# Patient Record
Sex: FEMALE | Race: BLACK OR AFRICAN AMERICAN | NOT HISPANIC OR LATINO | Employment: UNEMPLOYED | URBAN - METROPOLITAN AREA
[De-identification: names, ages, dates, MRNs, and addresses within clinical notes are randomized per-mention and may not be internally consistent; named-entity substitution may affect disease eponyms.]

---

## 2023-06-26 RX ORDER — FUROSEMIDE 20 MG/1
1 TABLET ORAL DAILY PRN
COMMUNITY
Start: 2020-03-13

## 2023-06-26 RX ORDER — DOCUSATE SODIUM 50 MG/5ML
LIQUID ORAL
COMMUNITY
Start: 2020-11-20

## 2023-06-26 RX ORDER — FERROUS SULFATE 220 (44)/5
SOLUTION, ORAL ORAL
COMMUNITY
Start: 2020-10-05

## 2023-06-26 RX ORDER — WARFARIN 4 MG/1
TABLET ORAL
COMMUNITY
Start: 2020-08-31

## 2023-06-26 RX ORDER — FLUTICASONE PROPIONATE 50 MCG
1 SPRAY, SUSPENSION (ML) NASAL 2 TIMES DAILY
COMMUNITY
Start: 2020-12-26

## 2023-06-26 RX ORDER — MELATONIN 3 MG
CAPSULE ORAL
COMMUNITY
Start: 2020-01-30

## 2023-06-26 RX ORDER — ACETAMINOPHEN 500 MG
TABLET ORAL EVERY 6 HOURS PRN
COMMUNITY

## 2023-06-26 RX ORDER — LIDOCAINE 50 MG/G
PATCH TOPICAL
COMMUNITY
Start: 2020-01-30

## 2023-06-26 RX ORDER — WARFARIN 3 MG/1
TABLET ORAL
COMMUNITY
Start: 2020-03-13

## 2023-06-26 RX ORDER — ONDANSETRON 4 MG/1
TABLET, FILM COATED ORAL
COMMUNITY
Start: 2020-12-29

## 2023-06-26 RX ORDER — WARFARIN 2.5 MG/1
TABLET ORAL
COMMUNITY
Start: 2020-04-27

## 2023-06-26 RX ORDER — LACTULOSE 10 G/15ML
SOLUTION ORAL; RECTAL
COMMUNITY
Start: 2020-11-19

## 2023-06-26 RX ORDER — PHENOL 1.4 %
1 AEROSOL, SPRAY (ML) MUCOUS MEMBRANE DAILY
COMMUNITY
Start: 2020-01-30

## 2023-06-26 RX ORDER — ASCORBIC ACID 500 MG
1 TABLET ORAL DAILY
COMMUNITY
Start: 2020-09-15

## 2024-07-24 ENCOUNTER — APPOINTMENT (OUTPATIENT)
Dept: RADIOLOGY | Facility: HOSPITAL | Age: 89
End: 2024-07-24
Payer: MEDICARE

## 2024-07-24 ENCOUNTER — CLINICAL SUPPORT (OUTPATIENT)
Dept: EMERGENCY MEDICINE | Facility: HOSPITAL | Age: 89
End: 2024-07-24
Payer: MEDICARE

## 2024-07-24 ENCOUNTER — HOSPITAL ENCOUNTER (EMERGENCY)
Facility: HOSPITAL | Age: 89
Discharge: HOME | End: 2024-07-25
Attending: EMERGENCY MEDICINE
Payer: MEDICARE

## 2024-07-24 VITALS
HEART RATE: 60 BPM | SYSTOLIC BLOOD PRESSURE: 124 MMHG | TEMPERATURE: 98 F | OXYGEN SATURATION: 97 % | RESPIRATION RATE: 16 BRPM | HEIGHT: 63 IN | BODY MASS INDEX: 22.5 KG/M2 | DIASTOLIC BLOOD PRESSURE: 76 MMHG | WEIGHT: 127 LBS

## 2024-07-24 DIAGNOSIS — R11.2 NAUSEA AND VOMITING, UNSPECIFIED VOMITING TYPE: Primary | ICD-10-CM

## 2024-07-24 DIAGNOSIS — N30.00 ACUTE CYSTITIS WITHOUT HEMATURIA: ICD-10-CM

## 2024-07-24 LAB
ALBUMIN SERPL BCP-MCNC: 3.4 G/DL (ref 3.4–5)
ALP SERPL-CCNC: 281 U/L (ref 33–136)
ALT SERPL W P-5'-P-CCNC: 301 U/L (ref 7–45)
ANION GAP BLDV CALCULATED.4IONS-SCNC: 11 MMOL/L (ref 10–25)
ANION GAP SERPL CALC-SCNC: 15 MMOL/L (ref 10–20)
APPEARANCE UR: CLEAR
AST SERPL W P-5'-P-CCNC: 226 U/L (ref 9–39)
ATRIAL RATE: 63 BPM
BACTERIA #/AREA URNS AUTO: ABNORMAL /HPF
BASE EXCESS BLDV CALC-SCNC: 3.5 MMOL/L (ref -2–3)
BASOPHILS # BLD AUTO: 0.02 X10*3/UL (ref 0–0.1)
BASOPHILS NFR BLD AUTO: 0.2 %
BILIRUB SERPL-MCNC: 1.9 MG/DL (ref 0–1.2)
BILIRUB UR STRIP.AUTO-MCNC: NEGATIVE MG/DL
BODY TEMPERATURE: 37 DEGREES CELSIUS
BUN SERPL-MCNC: 15 MG/DL (ref 6–23)
CA-I BLDV-SCNC: 1.1 MMOL/L (ref 1.1–1.33)
CALCIUM SERPL-MCNC: 8.8 MG/DL (ref 8.6–10.6)
CARDIAC TROPONIN I PNL SERPL HS: 13 NG/L (ref 0–34)
CHLORIDE BLDV-SCNC: 103 MMOL/L (ref 98–107)
CHLORIDE SERPL-SCNC: 103 MMOL/L (ref 98–107)
CO2 SERPL-SCNC: 25 MMOL/L (ref 21–32)
COLOR UR: YELLOW
CREAT SERPL-MCNC: 0.87 MG/DL (ref 0.5–1.05)
EGFRCR SERPLBLD CKD-EPI 2021: 63 ML/MIN/1.73M*2
EOSINOPHIL # BLD AUTO: 0.06 X10*3/UL (ref 0–0.4)
EOSINOPHIL NFR BLD AUTO: 0.5 %
ERYTHROCYTE [DISTWIDTH] IN BLOOD BY AUTOMATED COUNT: 15.5 % (ref 11.5–14.5)
GLUCOSE BLDV-MCNC: 117 MG/DL (ref 74–99)
GLUCOSE SERPL-MCNC: 114 MG/DL (ref 74–99)
GLUCOSE UR STRIP.AUTO-MCNC: NORMAL MG/DL
HCO3 BLDV-SCNC: 27.8 MMOL/L (ref 22–26)
HCT VFR BLD AUTO: 37.6 % (ref 36–46)
HCT VFR BLD EST: 38 % (ref 36–46)
HGB BLD-MCNC: 12.2 G/DL (ref 12–16)
HGB BLDV-MCNC: 12.8 G/DL (ref 12–16)
IMM GRANULOCYTES # BLD AUTO: 0.04 X10*3/UL (ref 0–0.5)
IMM GRANULOCYTES NFR BLD AUTO: 0.3 % (ref 0–0.9)
INHALED O2 CONCENTRATION: 21 %
INR PPP: 1.7 (ref 0.9–1.1)
KETONES UR STRIP.AUTO-MCNC: NEGATIVE MG/DL
LACTATE BLDV-SCNC: 1.5 MMOL/L (ref 0.4–2)
LEUKOCYTE ESTERASE UR QL STRIP.AUTO: ABNORMAL
LIPASE SERPL-CCNC: 3 U/L (ref 9–82)
LYMPHOCYTES # BLD AUTO: 1.32 X10*3/UL (ref 0.8–3)
LYMPHOCYTES NFR BLD AUTO: 10.3 %
MAGNESIUM SERPL-MCNC: 2.17 MG/DL (ref 1.6–2.4)
MCH RBC QN AUTO: 22.6 PG (ref 26–34)
MCHC RBC AUTO-ENTMCNC: 32.4 G/DL (ref 32–36)
MCV RBC AUTO: 70 FL (ref 80–100)
MONOCYTES # BLD AUTO: 0.95 X10*3/UL (ref 0.05–0.8)
MONOCYTES NFR BLD AUTO: 7.4 %
MUCOUS THREADS #/AREA URNS AUTO: ABNORMAL /LPF
NEUTROPHILS # BLD AUTO: 10.4 X10*3/UL (ref 1.6–5.5)
NEUTROPHILS NFR BLD AUTO: 81.3 %
NITRITE UR QL STRIP.AUTO: NEGATIVE
NRBC BLD-RTO: 0 /100 WBCS (ref 0–0)
OXYHGB MFR BLDV: 75.9 % (ref 45–75)
P AXIS: 56 DEGREES
P OFFSET: 202 MS
P ONSET: 138 MS
PCO2 BLDV: 40 MM HG (ref 41–51)
PH BLDV: 7.45 PH (ref 7.33–7.43)
PH UR STRIP.AUTO: 6 [PH]
PLATELET # BLD AUTO: 309 X10*3/UL (ref 150–450)
PO2 BLDV: 47 MM HG (ref 35–45)
POTASSIUM BLDV-SCNC: 4.2 MMOL/L (ref 3.5–5.3)
POTASSIUM SERPL-SCNC: 3.9 MMOL/L (ref 3.5–5.3)
PR INTERVAL: 156 MS
PROT SERPL-MCNC: 7.3 G/DL (ref 6.4–8.2)
PROT UR STRIP.AUTO-MCNC: NEGATIVE MG/DL
PROTHROMBIN TIME: 19.1 SECONDS (ref 9.8–12.8)
Q ONSET: 216 MS
QRS COUNT: 11 BEATS
QRS DURATION: 82 MS
QT INTERVAL: 452 MS
QTC CALCULATION(BAZETT): 462 MS
QTC FREDERICIA: 459 MS
R AXIS: -53 DEGREES
RBC # BLD AUTO: 5.41 X10*6/UL (ref 4–5.2)
RBC # UR STRIP.AUTO: ABNORMAL /UL
RBC #/AREA URNS AUTO: ABNORMAL /HPF
SAO2 % BLDV: 78 % (ref 45–75)
SODIUM BLDV-SCNC: 138 MMOL/L (ref 136–145)
SODIUM SERPL-SCNC: 139 MMOL/L (ref 136–145)
SP GR UR STRIP.AUTO: 1.02
T AXIS: -65 DEGREES
T OFFSET: 442 MS
UROBILINOGEN UR STRIP.AUTO-MCNC: ABNORMAL MG/DL
VENTRICULAR RATE: 63 BPM
WBC # BLD AUTO: 12.8 X10*3/UL (ref 4.4–11.3)
WBC #/AREA URNS AUTO: ABNORMAL /HPF

## 2024-07-24 PROCEDURE — 74177 CT ABD & PELVIS W/CONTRAST: CPT

## 2024-07-24 PROCEDURE — 81001 URINALYSIS AUTO W/SCOPE: CPT

## 2024-07-24 PROCEDURE — 93010 ELECTROCARDIOGRAM REPORT: CPT

## 2024-07-24 PROCEDURE — 76705 ECHO EXAM OF ABDOMEN: CPT

## 2024-07-24 PROCEDURE — 96365 THER/PROPH/DIAG IV INF INIT: CPT | Mod: 59

## 2024-07-24 PROCEDURE — 87186 SC STD MICRODIL/AGAR DIL: CPT

## 2024-07-24 PROCEDURE — 96375 TX/PRO/DX INJ NEW DRUG ADDON: CPT

## 2024-07-24 PROCEDURE — 84132 ASSAY OF SERUM POTASSIUM: CPT

## 2024-07-24 PROCEDURE — 99285 EMERGENCY DEPT VISIT HI MDM: CPT | Mod: 25

## 2024-07-24 PROCEDURE — 85610 PROTHROMBIN TIME: CPT

## 2024-07-24 PROCEDURE — 2550000001 HC RX 255 CONTRASTS: Performed by: EMERGENCY MEDICINE

## 2024-07-24 PROCEDURE — 87086 URINE CULTURE/COLONY COUNT: CPT

## 2024-07-24 PROCEDURE — 76705 ECHO EXAM OF ABDOMEN: CPT | Performed by: RADIOLOGY

## 2024-07-24 PROCEDURE — 83690 ASSAY OF LIPASE: CPT

## 2024-07-24 PROCEDURE — 36415 COLL VENOUS BLD VENIPUNCTURE: CPT

## 2024-07-24 PROCEDURE — 2500000001 HC RX 250 WO HCPCS SELF ADMINISTERED DRUGS (ALT 637 FOR MEDICARE OP)

## 2024-07-24 PROCEDURE — 74177 CT ABD & PELVIS W/CONTRAST: CPT | Performed by: RADIOLOGY

## 2024-07-24 PROCEDURE — 84484 ASSAY OF TROPONIN QUANT: CPT

## 2024-07-24 PROCEDURE — 93005 ELECTROCARDIOGRAM TRACING: CPT

## 2024-07-24 PROCEDURE — 85025 COMPLETE CBC W/AUTO DIFF WBC: CPT

## 2024-07-24 PROCEDURE — 2500000004 HC RX 250 GENERAL PHARMACY W/ HCPCS (ALT 636 FOR OP/ED)

## 2024-07-24 PROCEDURE — 83735 ASSAY OF MAGNESIUM: CPT

## 2024-07-24 PROCEDURE — 84075 ASSAY ALKALINE PHOSPHATASE: CPT

## 2024-07-24 PROCEDURE — 99285 EMERGENCY DEPT VISIT HI MDM: CPT

## 2024-07-24 RX ORDER — WARFARIN 2 MG/1
4 TABLET ORAL ONCE
Status: COMPLETED | OUTPATIENT
Start: 2024-07-24 | End: 2024-07-24

## 2024-07-24 RX ORDER — CEPHALEXIN 250 MG/5ML
500 POWDER, FOR SUSPENSION ORAL 3 TIMES DAILY
Qty: 210 ML | Refills: 0 | Status: SHIPPED | OUTPATIENT
Start: 2024-07-24 | End: 2024-07-31

## 2024-07-24 RX ORDER — NITROFURANTOIN 25; 75 MG/1; MG/1
100 CAPSULE ORAL 2 TIMES DAILY
Qty: 10 CAPSULE | Refills: 0 | Status: SHIPPED | OUTPATIENT
Start: 2024-07-24 | End: 2024-07-24

## 2024-07-24 RX ORDER — ONDANSETRON HYDROCHLORIDE 2 MG/ML
4 INJECTION, SOLUTION INTRAVENOUS ONCE
Status: COMPLETED | OUTPATIENT
Start: 2024-07-24 | End: 2024-07-24

## 2024-07-24 RX ORDER — CEFTRIAXONE 2 G/50ML
2 INJECTION, SOLUTION INTRAVENOUS ONCE
Status: COMPLETED | OUTPATIENT
Start: 2024-07-24 | End: 2024-07-24

## 2024-07-24 RX ORDER — ONDANSETRON 4 MG/1
4 TABLET, FILM COATED ORAL EVERY 6 HOURS
Qty: 12 TABLET | Refills: 0 | Status: SHIPPED | OUTPATIENT
Start: 2024-07-24 | End: 2024-07-27

## 2024-07-24 ASSESSMENT — PAIN DESCRIPTION - DESCRIPTORS: DESCRIPTORS: DISCOMFORT

## 2024-07-24 ASSESSMENT — PAIN SCALES - GENERAL: PAINLEVEL_OUTOF10: 5 - MODERATE PAIN

## 2024-07-24 ASSESSMENT — PAIN DESCRIPTION - PROGRESSION: CLINICAL_PROGRESSION: NOT CHANGED

## 2024-07-24 ASSESSMENT — PAIN DESCRIPTION - LOCATION: LOCATION: BACK

## 2024-07-24 ASSESSMENT — PAIN - FUNCTIONAL ASSESSMENT: PAIN_FUNCTIONAL_ASSESSMENT: 0-10

## 2024-07-24 NOTE — ED PROVIDER NOTES
HPI   Chief Complaint   Patient presents with    Vomiting     N/V times 2 days        89-year-old female with history of Alzheimer's dementia, HERMAN, HLD, DVT/PE on Coumadin presents for chief complaint of nausea and vomiting.  Intermittent for 3 months.  Daughter estimates approximately 2 to 3 days of emesis per month.  This episode started yesterday evening after dinner.  Nonbloody nonbilious emesis.  Refractory to Zofran.  Continued this morning and was advised by home health aide to come to the ED.  Patient baseline alert and oriented x 1.  Lives at home with daughter/family with home health aide provided by [facility].  Per daughter, patient is bedbound most of the time.  Endorsed 1 episode of loose stool this morning.  Daughter states that they had scheduled a GI appointment for October or November but feels this is too far away to wait.  Patient take Zofran as needed for nausea.  Patient currently denies any nausea or pain.  Family notes no known fevers.  Patient denies any chills or myalgia.  She denies any complaints at all at this point.  Please note that most of this history was provided by daughter.  She was at the bedside.            Patient History   Past Medical History:   Diagnosis Date    Personal history of other venous thrombosis and embolism 01/30/2020    History of deep venous thrombosis    Personal history of other venous thrombosis and embolism 09/13/2020    History of deep venous thrombosis    Personal history of pulmonary embolism 09/13/2020    History of pulmonary embolism    Unspecified dementia, unspecified severity, without behavioral disturbance, psychotic disturbance, mood disturbance, and anxiety (Multi) 11/22/2020    Dementia     Past Surgical History:   Procedure Laterality Date    OTHER SURGICAL HISTORY  01/27/2020    Inferior vena cava filter placement     No family history on file.  Social History     Tobacco Use    Smoking status: Not on file    Smokeless tobacco: Not on file    Substance Use Topics    Alcohol use: Not on file    Drug use: Not on file       Physical Exam   ED Triage Vitals [07/24/24 1224]   Temperature Heart Rate Respirations BP   36.7 °C (98 °F) 57 18 113/74      Pulse Ox Temp Source Heart Rate Source Patient Position   96 % Temporal Monitor --      BP Location FiO2 (%)     -- --       Physical Exam  Constitutional:       Appearance: Normal appearance.   HENT:      Head: Normocephalic and atraumatic.      Mouth/Throat:      Mouth: Mucous membranes are moist.      Pharynx: Oropharynx is clear.   Eyes:      Extraocular Movements: Extraocular movements intact.      Conjunctiva/sclera: Conjunctivae normal.      Pupils: Pupils are equal, round, and reactive to light.   Cardiovascular:      Rate and Rhythm: Normal rate and regular rhythm.      Pulses: Normal pulses.      Heart sounds: Normal heart sounds.   Pulmonary:      Effort: Pulmonary effort is normal.      Breath sounds: Normal breath sounds.   Abdominal:      General: Abdomen is flat.      Palpations: Abdomen is soft.      Tenderness: There is no abdominal tenderness.   Musculoskeletal:         General: Normal range of motion.      Cervical back: Normal range of motion and neck supple.   Skin:     General: Skin is warm and dry.      Capillary Refill: Capillary refill takes less than 2 seconds.   Neurological:      General: No focal deficit present.      Mental Status: She is alert and oriented to person, place, and time.      GCS: GCS eye subscore is 4. GCS verbal subscore is 4. GCS motor subscore is 6.   Psychiatric:         Mood and Affect: Mood normal.         Behavior: Behavior normal.         Thought Content: Thought content normal.         Judgment: Judgment normal.         ED Course & MDM   Diagnoses as of 07/26/24 1319   Nausea and vomiting, unspecified vomiting type   Acute cystitis without hematuria                       No data recorded                      Medical Decision Making  Vital signs reviewed,  unremarkable at this time.  Patient is well-appearing overall in no apparent distress, however she is unable to answer most questions except for her name.  Alert and oriented times person at baseline with history of Alzheimer's dementia.  Daughter states that she is currently at her baseline.  Most history was obtained by daughter at the bedside.  Diagnostic testing performed.  Patient is still passing stool, and daughter endorsed 1 loose stool this morning.  Intermittent episodes of nausea and vomiting have happened 2-3 times per month over the last 3 months.  Patient takes Zofran at home.  Given dose of Zofran in the ED. CBC with differential showed mild leukocytosis at 12.8.  CMP shows elevated liver labs including alkaline phosphatase, AST, ALT, total bilirubin.  Venous full panel showed unremarkable.  Lipase less than 3.  Magnesium and troponin unremarkable and within normal ranges.  PT and INR slightly elevated.  CT abdomen and pelvis as well as ultrasound gallbladder pending.  Patient remained calm and cooperative in stable condition for the duration of my shift.  Handed off pending scans and reevaluation.  Zofran has been given for nausea.        Procedure  Procedures     WALTER Finch  07/24/24 1500       JEANETTE Finch-DG  07/26/24 1911

## 2024-07-25 LAB — HOLD SPECIMEN: NORMAL

## 2024-07-25 NOTE — DISCHARGE INSTRUCTIONS
Any new or concerning symptoms arise please return to the emergency department.  She had bacteria in her urine consistent with a urinary tract infection.  Her symptoms likely related to that.  She had a CT scan done in addition to a right upper quadrant ultrasound done which was reassuring without any concerning findings.  It is also important for her to follow-up with her primary care doctor in the next week to have further evaluation and management of her symptoms.  If she has been having nausea and vomiting.  3 months and her symptoms do not resolve this is something that needs to be chronically managed and monitored.  Additionally she has elevated liver enzymes that do need to be monitored and repeated and may require further workup by her primary care doctor.

## 2024-07-26 LAB — BACTERIA UR CULT: ABNORMAL

## 2024-07-27 ENCOUNTER — TELEPHONE (OUTPATIENT)
Dept: PHARMACY | Facility: HOSPITAL | Age: 89
End: 2024-07-27
Payer: MEDICARE

## 2024-07-27 NOTE — PROGRESS NOTES
EDPD Note: Antibiotics Reviewed and Warranted    Contacted Mr./Mrs./Ms. Niharika Pandya regarding a positive urine culture/result that was taken during their recent emergency room visit. I completed education with daughter . The patient is being treated appropriately with Keflex 500mg. Daughter states patient is feeling much better. No urinary compliants. Advised patient to finish full course of antibiotic and follow up with PCP or urgent care if symptoms do not completely resolve.      Susceptibility data from last 90 days.  Collected Specimen Info Organism Amoxicillin/Clavulanate Ampicillin Ampicillin/Sulbactam Cefazolin Cefazolin (uncomplicated UTIs only) Ciprofloxacin Gentamicin Nitrofurantoin Piperacillin/Tazobactam Trimethoprim/Sulfamethoxazole   07/24/24 Urine from Clean Catch/Voided Escherichia coli  S  R  S  S  S  S  S  S  S  S        No further follow up needed from EDPD Team.     Megan Barrios, PharmD

## 2024-10-01 ENCOUNTER — APPOINTMENT (OUTPATIENT)
Dept: GASTROENTEROLOGY | Facility: HOSPITAL | Age: 89
End: 2024-10-01
Payer: MEDICARE

## 2025-02-12 ENCOUNTER — HOSPITAL ENCOUNTER (INPATIENT)
Facility: HOSPITAL | Age: OVER 89
LOS: 1 days | Discharge: HOME | DRG: 689 | End: 2025-02-14
Attending: EMERGENCY MEDICINE | Admitting: STUDENT IN AN ORGANIZED HEALTH CARE EDUCATION/TRAINING PROGRAM
Payer: MEDICARE

## 2025-02-12 ENCOUNTER — CLINICAL SUPPORT (OUTPATIENT)
Dept: EMERGENCY MEDICINE | Facility: HOSPITAL | Age: OVER 89
DRG: 689 | End: 2025-02-12
Payer: MEDICARE

## 2025-02-12 DIAGNOSIS — N30.00 ACUTE CYSTITIS WITHOUT HEMATURIA: ICD-10-CM

## 2025-02-12 DIAGNOSIS — R40.4 EPISODE OF UNRESPONSIVENESS: Primary | ICD-10-CM

## 2025-02-12 LAB
ANION GAP BLDV CALCULATED.4IONS-SCNC: 9 MMOL/L (ref 10–25)
BASE EXCESS BLDV CALC-SCNC: 0.6 MMOL/L (ref -2–3)
BASOPHILS # BLD AUTO: 0.01 X10*3/UL (ref 0–0.1)
BASOPHILS # BLD AUTO: 0.03 X10*3/UL (ref 0–0.1)
BASOPHILS NFR BLD AUTO: 0.2 %
BASOPHILS NFR BLD AUTO: 0.3 %
BODY TEMPERATURE: 37 DEGREES CELSIUS
CA-I BLDV-SCNC: 1.2 MMOL/L (ref 1.1–1.33)
CARDIAC TROPONIN I PNL SERPL HS: 4 NG/L (ref 0–34)
CARDIAC TROPONIN I PNL SERPL HS: 4 NG/L (ref 0–34)
CHLORIDE BLDV-SCNC: 107 MMOL/L (ref 98–107)
EOSINOPHIL # BLD AUTO: 0.02 X10*3/UL (ref 0–0.4)
EOSINOPHIL # BLD AUTO: 0.08 X10*3/UL (ref 0–0.4)
EOSINOPHIL NFR BLD AUTO: 0.3 %
EOSINOPHIL NFR BLD AUTO: 0.8 %
ERYTHROCYTE [DISTWIDTH] IN BLOOD BY AUTOMATED COUNT: 15.3 % (ref 11.5–14.5)
ERYTHROCYTE [DISTWIDTH] IN BLOOD BY AUTOMATED COUNT: 15.4 % (ref 11.5–14.5)
FLUAV RNA RESP QL NAA+PROBE: NOT DETECTED
FLUBV RNA RESP QL NAA+PROBE: NOT DETECTED
GLUCOSE BLD MANUAL STRIP-MCNC: 115 MG/DL (ref 74–99)
GLUCOSE BLDV-MCNC: 124 MG/DL (ref 74–99)
HCO3 BLDV-SCNC: 27.4 MMOL/L (ref 22–26)
HCT VFR BLD AUTO: 20.5 % (ref 36–46)
HCT VFR BLD AUTO: 36.9 % (ref 36–46)
HCT VFR BLD EST: 38 % (ref 36–46)
HGB BLD-MCNC: 11.7 G/DL (ref 12–16)
HGB BLD-MCNC: 6.7 G/DL (ref 12–16)
HGB BLDV-MCNC: 12.8 G/DL (ref 12–16)
HOLD SPECIMEN: NORMAL
HYPOCHROMIA BLD QL SMEAR: NORMAL
IMM GRANULOCYTES # BLD AUTO: 0.04 X10*3/UL (ref 0–0.5)
IMM GRANULOCYTES # BLD AUTO: 0.04 X10*3/UL (ref 0–0.5)
IMM GRANULOCYTES NFR BLD AUTO: 0.4 % (ref 0–0.9)
IMM GRANULOCYTES NFR BLD AUTO: 0.6 % (ref 0–0.9)
LACTATE BLDV-SCNC: 2.3 MMOL/L (ref 0.4–2)
LYMPHOCYTES # BLD AUTO: 1.16 X10*3/UL (ref 0.8–3)
LYMPHOCYTES # BLD AUTO: 3.02 X10*3/UL (ref 0.8–3)
LYMPHOCYTES NFR BLD AUTO: 18.4 %
LYMPHOCYTES NFR BLD AUTO: 30.4 %
MCH RBC QN AUTO: 22.3 PG (ref 26–34)
MCH RBC QN AUTO: 23.4 PG (ref 26–34)
MCHC RBC AUTO-ENTMCNC: 31.7 G/DL (ref 32–36)
MCHC RBC AUTO-ENTMCNC: 32.7 G/DL (ref 32–36)
MCV RBC AUTO: 70 FL (ref 80–100)
MCV RBC AUTO: 72 FL (ref 80–100)
MONOCYTES # BLD AUTO: 0.46 X10*3/UL (ref 0.05–0.8)
MONOCYTES # BLD AUTO: 0.84 X10*3/UL (ref 0.05–0.8)
MONOCYTES NFR BLD AUTO: 7.3 %
MONOCYTES NFR BLD AUTO: 8.5 %
NEUTROPHILS # BLD AUTO: 4.62 X10*3/UL (ref 1.6–5.5)
NEUTROPHILS # BLD AUTO: 5.92 X10*3/UL (ref 1.6–5.5)
NEUTROPHILS NFR BLD AUTO: 59.6 %
NEUTROPHILS NFR BLD AUTO: 73.2 %
NRBC BLD-RTO: 0 /100 WBCS (ref 0–0)
NRBC BLD-RTO: 0 /100 WBCS (ref 0–0)
OVALOCYTES BLD QL SMEAR: NORMAL
OXYHGB MFR BLDV: 30.7 % (ref 45–75)
PCO2 BLDV: 52 MM HG (ref 41–51)
PH BLDV: 7.33 PH (ref 7.33–7.43)
PLATELET # BLD AUTO: 167 X10*3/UL (ref 150–450)
PLATELET # BLD AUTO: 254 X10*3/UL (ref 150–450)
PO2 BLDV: 27 MM HG (ref 35–45)
POTASSIUM BLDV-SCNC: 4.4 MMOL/L (ref 3.5–5.3)
RBC # BLD AUTO: 2.86 X10*6/UL (ref 4–5.2)
RBC # BLD AUTO: 5.24 X10*6/UL (ref 4–5.2)
RBC MORPH BLD: NORMAL
SAO2 % BLDV: 31 % (ref 45–75)
SARS-COV-2 RNA RESP QL NAA+PROBE: NOT DETECTED
SODIUM BLDV-SCNC: 139 MMOL/L (ref 136–145)
TARGETS BLD QL SMEAR: NORMAL
WBC # BLD AUTO: 6.3 X10*3/UL (ref 4.4–11.3)
WBC # BLD AUTO: 9.9 X10*3/UL (ref 4.4–11.3)

## 2025-02-12 PROCEDURE — 36415 COLL VENOUS BLD VENIPUNCTURE: CPT

## 2025-02-12 PROCEDURE — 87075 CULTR BACTERIA EXCEPT BLOOD: CPT | Performed by: EMERGENCY MEDICINE

## 2025-02-12 PROCEDURE — 85025 COMPLETE CBC W/AUTO DIFF WBC: CPT

## 2025-02-12 PROCEDURE — 82947 ASSAY GLUCOSE BLOOD QUANT: CPT

## 2025-02-12 PROCEDURE — 93005 ELECTROCARDIOGRAM TRACING: CPT

## 2025-02-12 PROCEDURE — 85025 COMPLETE CBC W/AUTO DIFF WBC: CPT | Performed by: EMERGENCY MEDICINE

## 2025-02-12 PROCEDURE — 99285 EMERGENCY DEPT VISIT HI MDM: CPT | Performed by: EMERGENCY MEDICINE

## 2025-02-12 PROCEDURE — 82435 ASSAY OF BLOOD CHLORIDE: CPT

## 2025-02-12 PROCEDURE — 84132 ASSAY OF SERUM POTASSIUM: CPT

## 2025-02-12 PROCEDURE — 84484 ASSAY OF TROPONIN QUANT: CPT

## 2025-02-12 PROCEDURE — 87040 BLOOD CULTURE FOR BACTERIA: CPT | Performed by: EMERGENCY MEDICINE

## 2025-02-12 PROCEDURE — 87636 SARSCOV2 & INF A&B AMP PRB: CPT

## 2025-02-12 PROCEDURE — 93010 ELECTROCARDIOGRAM REPORT: CPT | Performed by: EMERGENCY MEDICINE

## 2025-02-12 ASSESSMENT — PAIN - FUNCTIONAL ASSESSMENT: PAIN_FUNCTIONAL_ASSESSMENT: UNABLE TO SELF-REPORT

## 2025-02-13 ENCOUNTER — APPOINTMENT (OUTPATIENT)
Dept: RADIOLOGY | Facility: HOSPITAL | Age: OVER 89
DRG: 689 | End: 2025-02-13
Payer: MEDICARE

## 2025-02-13 PROBLEM — R40.4 EPISODE OF UNRESPONSIVENESS: Status: ACTIVE | Noted: 2025-02-13

## 2025-02-13 LAB
ALBUMIN SERPL BCP-MCNC: 3.2 G/DL (ref 3.4–5)
ALBUMIN SERPL BCP-MCNC: 3.3 G/DL (ref 3.4–5)
ALP SERPL-CCNC: 138 U/L (ref 33–136)
ALP SERPL-CCNC: 175 U/L (ref 33–136)
ALT SERPL W P-5'-P-CCNC: 403 U/L (ref 7–45)
ALT SERPL W P-5'-P-CCNC: 710 U/L (ref 7–45)
ANA SER QL HEP2 SUBST: NEGATIVE
ANION GAP SERPL CALC-SCNC: 11 MMOL/L (ref 10–20)
ANION GAP SERPL CALC-SCNC: 13 MMOL/L (ref 10–20)
APAP SERPL-MCNC: <10 UG/ML
APPEARANCE UR: ABNORMAL
AST SERPL W P-5'-P-CCNC: 564 U/L (ref 9–39)
AST SERPL W P-5'-P-CCNC: 844 U/L (ref 9–39)
ATRIAL RATE: 74 BPM
BASOPHILS # BLD AUTO: 0.02 X10*3/UL (ref 0–0.1)
BASOPHILS NFR BLD AUTO: 0.3 %
BILIRUB SERPL-MCNC: 0.8 MG/DL (ref 0–1.2)
BILIRUB SERPL-MCNC: 1 MG/DL (ref 0–1.2)
BILIRUB UR STRIP.AUTO-MCNC: NEGATIVE MG/DL
BUN SERPL-MCNC: 15 MG/DL (ref 6–23)
BUN SERPL-MCNC: 17 MG/DL (ref 6–23)
CALCIUM SERPL-MCNC: 8.2 MG/DL (ref 8.6–10.6)
CALCIUM SERPL-MCNC: 8.3 MG/DL (ref 8.6–10.6)
CHLORIDE SERPL-SCNC: 107 MMOL/L (ref 98–107)
CHLORIDE SERPL-SCNC: 108 MMOL/L (ref 98–107)
CO2 SERPL-SCNC: 23 MMOL/L (ref 21–32)
CO2 SERPL-SCNC: 23 MMOL/L (ref 21–32)
COLOR UR: ABNORMAL
CREAT SERPL-MCNC: 0.59 MG/DL (ref 0.5–1.05)
CREAT SERPL-MCNC: 0.68 MG/DL (ref 0.5–1.05)
EGFRCR SERPLBLD CKD-EPI 2021: 83 ML/MIN/1.73M*2
EGFRCR SERPLBLD CKD-EPI 2021: 86 ML/MIN/1.73M*2
EOSINOPHIL # BLD AUTO: 0.04 X10*3/UL (ref 0–0.4)
EOSINOPHIL NFR BLD AUTO: 0.6 %
ERYTHROCYTE [DISTWIDTH] IN BLOOD BY AUTOMATED COUNT: 15 % (ref 11.5–14.5)
ETHANOL SERPL-MCNC: <10 MG/DL
GLUCOSE SERPL-MCNC: 103 MG/DL (ref 74–99)
GLUCOSE SERPL-MCNC: 128 MG/DL (ref 74–99)
GLUCOSE UR STRIP.AUTO-MCNC: NORMAL MG/DL
HAV IGM SER QL: NONREACTIVE
HBV CORE IGM SER QL: NONREACTIVE
HBV SURFACE AG SERPL QL IA: NONREACTIVE
HCT VFR BLD AUTO: 34.9 % (ref 36–46)
HCV AB SER QL: NONREACTIVE
HGB BLD-MCNC: 11.1 G/DL (ref 12–16)
HOLD SPECIMEN: NORMAL
IMM GRANULOCYTES # BLD AUTO: 0.03 X10*3/UL (ref 0–0.5)
IMM GRANULOCYTES NFR BLD AUTO: 0.4 % (ref 0–0.9)
KETONES UR STRIP.AUTO-MCNC: NEGATIVE MG/DL
LEUKOCYTE ESTERASE UR QL STRIP.AUTO: ABNORMAL
LYMPHOCYTES # BLD AUTO: 2.84 X10*3/UL (ref 0.8–3)
LYMPHOCYTES NFR BLD AUTO: 40 %
MAGNESIUM SERPL-MCNC: 2.07 MG/DL (ref 1.6–2.4)
MCH RBC QN AUTO: 22.7 PG (ref 26–34)
MCHC RBC AUTO-ENTMCNC: 31.8 G/DL (ref 32–36)
MCV RBC AUTO: 72 FL (ref 80–100)
MONOCYTES # BLD AUTO: 0.58 X10*3/UL (ref 0.05–0.8)
MONOCYTES NFR BLD AUTO: 8.2 %
MUCOUS THREADS #/AREA URNS AUTO: ABNORMAL /LPF
NEUTROPHILS # BLD AUTO: 3.59 X10*3/UL (ref 1.6–5.5)
NEUTROPHILS NFR BLD AUTO: 50.5 %
NITRITE UR QL STRIP.AUTO: NEGATIVE
NRBC BLD-RTO: 0 /100 WBCS (ref 0–0)
P AXIS: 47 DEGREES
P OFFSET: 185 MS
P ONSET: 141 MS
PH UR STRIP.AUTO: 6.5 [PH]
PLATELET # BLD AUTO: 262 X10*3/UL (ref 150–450)
POTASSIUM SERPL-SCNC: 3.7 MMOL/L (ref 3.5–5.3)
POTASSIUM SERPL-SCNC: 3.8 MMOL/L (ref 3.5–5.3)
PR INTERVAL: 166 MS
PROT SERPL-MCNC: 6.4 G/DL (ref 6.4–8.2)
PROT SERPL-MCNC: 6.5 G/DL (ref 6.4–8.2)
PROT UR STRIP.AUTO-MCNC: ABNORMAL MG/DL
Q ONSET: 224 MS
QRS COUNT: 13 BEATS
QRS DURATION: 74 MS
QT INTERVAL: 406 MS
QTC CALCULATION(BAZETT): 450 MS
QTC FREDERICIA: 435 MS
R AXIS: -46 DEGREES
RBC # BLD AUTO: 4.88 X10*6/UL (ref 4–5.2)
RBC # UR STRIP.AUTO: ABNORMAL MG/DL
RBC #/AREA URNS AUTO: >20 /HPF
SALICYLATES SERPL-MCNC: <3 MG/DL
SODIUM SERPL-SCNC: 138 MMOL/L (ref 136–145)
SODIUM SERPL-SCNC: 139 MMOL/L (ref 136–145)
SP GR UR STRIP.AUTO: 1.02
SQUAMOUS #/AREA URNS AUTO: ABNORMAL /HPF
T AXIS: 22 DEGREES
T OFFSET: 427 MS
UROBILINOGEN UR STRIP.AUTO-MCNC: ABNORMAL MG/DL
VENTRICULAR RATE: 74 BPM
WBC # BLD AUTO: 7.1 X10*3/UL (ref 4.4–11.3)
WBC #/AREA URNS AUTO: >50 /HPF
WBC CLUMPS #/AREA URNS AUTO: ABNORMAL /HPF

## 2025-02-13 PROCEDURE — 80053 COMPREHEN METABOLIC PANEL: CPT

## 2025-02-13 PROCEDURE — 74177 CT ABD & PELVIS W/CONTRAST: CPT | Performed by: STUDENT IN AN ORGANIZED HEALTH CARE EDUCATION/TRAINING PROGRAM

## 2025-02-13 PROCEDURE — 71046 X-RAY EXAM CHEST 2 VIEWS: CPT | Performed by: STUDENT IN AN ORGANIZED HEALTH CARE EDUCATION/TRAINING PROGRAM

## 2025-02-13 PROCEDURE — 72128 CT CHEST SPINE W/O DYE: CPT | Mod: RCN | Performed by: STUDENT IN AN ORGANIZED HEALTH CARE EDUCATION/TRAINING PROGRAM

## 2025-02-13 PROCEDURE — 2550000001 HC RX 255 CONTRASTS: Performed by: EMERGENCY MEDICINE

## 2025-02-13 PROCEDURE — 80320 DRUG SCREEN QUANTALCOHOLS: CPT | Performed by: STUDENT IN AN ORGANIZED HEALTH CARE EDUCATION/TRAINING PROGRAM

## 2025-02-13 PROCEDURE — 86256 FLUORESCENT ANTIBODY TITER: CPT

## 2025-02-13 PROCEDURE — 86038 ANTINUCLEAR ANTIBODIES: CPT

## 2025-02-13 PROCEDURE — 36415 COLL VENOUS BLD VENIPUNCTURE: CPT

## 2025-02-13 PROCEDURE — 86705 HEP B CORE ANTIBODY IGM: CPT

## 2025-02-13 PROCEDURE — 76705 ECHO EXAM OF ABDOMEN: CPT | Performed by: RADIOLOGY

## 2025-02-13 PROCEDURE — 81003 URINALYSIS AUTO W/O SCOPE: CPT

## 2025-02-13 PROCEDURE — 80053 COMPREHEN METABOLIC PANEL: CPT | Performed by: EMERGENCY MEDICINE

## 2025-02-13 PROCEDURE — 96361 HYDRATE IV INFUSION ADD-ON: CPT

## 2025-02-13 PROCEDURE — 76705 ECHO EXAM OF ABDOMEN: CPT

## 2025-02-13 PROCEDURE — 36415 COLL VENOUS BLD VENIPUNCTURE: CPT | Performed by: EMERGENCY MEDICINE

## 2025-02-13 PROCEDURE — 2500000002 HC RX 250 W HCPCS SELF ADMINISTERED DRUGS (ALT 637 FOR MEDICARE OP, ALT 636 FOR OP/ED): Performed by: STUDENT IN AN ORGANIZED HEALTH CARE EDUCATION/TRAINING PROGRAM

## 2025-02-13 PROCEDURE — 86015 ACTIN ANTIBODY EACH: CPT

## 2025-02-13 PROCEDURE — 72128 CT CHEST SPINE W/O DYE: CPT | Mod: RCN

## 2025-02-13 PROCEDURE — 72125 CT NECK SPINE W/O DYE: CPT

## 2025-02-13 PROCEDURE — 70450 CT HEAD/BRAIN W/O DYE: CPT | Performed by: STUDENT IN AN ORGANIZED HEALTH CARE EDUCATION/TRAINING PROGRAM

## 2025-02-13 PROCEDURE — 72131 CT LUMBAR SPINE W/O DYE: CPT | Mod: RCN | Performed by: STUDENT IN AN ORGANIZED HEALTH CARE EDUCATION/TRAINING PROGRAM

## 2025-02-13 PROCEDURE — 80074 ACUTE HEPATITIS PANEL: CPT

## 2025-02-13 PROCEDURE — 71275 CT ANGIOGRAPHY CHEST: CPT | Performed by: STUDENT IN AN ORGANIZED HEALTH CARE EDUCATION/TRAINING PROGRAM

## 2025-02-13 PROCEDURE — 72125 CT NECK SPINE W/O DYE: CPT | Performed by: STUDENT IN AN ORGANIZED HEALTH CARE EDUCATION/TRAINING PROGRAM

## 2025-02-13 PROCEDURE — 87086 URINE CULTURE/COLONY COUNT: CPT

## 2025-02-13 PROCEDURE — 2500000004 HC RX 250 GENERAL PHARMACY W/ HCPCS (ALT 636 FOR OP/ED)

## 2025-02-13 PROCEDURE — 85025 COMPLETE CBC W/AUTO DIFF WBC: CPT

## 2025-02-13 PROCEDURE — 2500000001 HC RX 250 WO HCPCS SELF ADMINISTERED DRUGS (ALT 637 FOR MEDICARE OP)

## 2025-02-13 PROCEDURE — 70450 CT HEAD/BRAIN W/O DYE: CPT

## 2025-02-13 PROCEDURE — 96365 THER/PROPH/DIAG IV INF INIT: CPT

## 2025-02-13 PROCEDURE — 71046 X-RAY EXAM CHEST 2 VIEWS: CPT

## 2025-02-13 PROCEDURE — 83735 ASSAY OF MAGNESIUM: CPT | Performed by: EMERGENCY MEDICINE

## 2025-02-13 PROCEDURE — 74177 CT ABD & PELVIS W/CONTRAST: CPT

## 2025-02-13 PROCEDURE — 72131 CT LUMBAR SPINE W/O DYE: CPT | Mod: RCN

## 2025-02-13 PROCEDURE — 1210000001 HC SEMI-PRIVATE ROOM DAILY

## 2025-02-13 PROCEDURE — 2500000001 HC RX 250 WO HCPCS SELF ADMINISTERED DRUGS (ALT 637 FOR MEDICARE OP): Performed by: STUDENT IN AN ORGANIZED HEALTH CARE EDUCATION/TRAINING PROGRAM

## 2025-02-13 PROCEDURE — 99223 1ST HOSP IP/OBS HIGH 75: CPT | Performed by: INTERNAL MEDICINE

## 2025-02-13 PROCEDURE — 71275 CT ANGIOGRAPHY CHEST: CPT

## 2025-02-13 PROCEDURE — 99232 SBSQ HOSP IP/OBS MODERATE 35: CPT | Performed by: STUDENT IN AN ORGANIZED HEALTH CARE EDUCATION/TRAINING PROGRAM

## 2025-02-13 RX ORDER — BISACODYL 10 MG/1
10 SUPPOSITORY RECTAL ONCE
Status: COMPLETED | OUTPATIENT
Start: 2025-02-13 | End: 2025-02-13

## 2025-02-13 RX ORDER — CEFTRIAXONE 1 G/50ML
1 INJECTION, SOLUTION INTRAVENOUS EVERY 24 HOURS
Status: DISCONTINUED | OUTPATIENT
Start: 2025-02-14 | End: 2025-02-14 | Stop reason: HOSPADM

## 2025-02-13 RX ORDER — LACTULOSE 10 G/15ML
10 SOLUTION ORAL 2 TIMES DAILY
Status: DISCONTINUED | OUTPATIENT
Start: 2025-02-13 | End: 2025-02-14 | Stop reason: HOSPADM

## 2025-02-13 RX ORDER — PANTOPRAZOLE SODIUM 20 MG/1
20 TABLET, DELAYED RELEASE ORAL
COMMUNITY

## 2025-02-13 RX ORDER — CEFTRIAXONE 1 G/50ML
1 INJECTION, SOLUTION INTRAVENOUS ONCE
Status: COMPLETED | OUTPATIENT
Start: 2025-02-13 | End: 2025-02-13

## 2025-02-13 RX ORDER — DOCUSATE SODIUM 50 MG/5ML
100 LIQUID ORAL DAILY
Status: DISCONTINUED | OUTPATIENT
Start: 2025-02-13 | End: 2025-02-14 | Stop reason: HOSPADM

## 2025-02-13 RX ORDER — POLYETHYLENE GLYCOL 3350 17 G/17G
17 POWDER, FOR SOLUTION ORAL DAILY
Status: DISCONTINUED | OUTPATIENT
Start: 2025-02-13 | End: 2025-02-14 | Stop reason: HOSPADM

## 2025-02-13 RX ORDER — PANTOPRAZOLE SODIUM 20 MG/1
20 TABLET, DELAYED RELEASE ORAL
Status: DISCONTINUED | OUTPATIENT
Start: 2025-02-14 | End: 2025-02-14 | Stop reason: HOSPADM

## 2025-02-13 RX ADMIN — POLYETHYLENE GLYCOL 3350 17 G: 17 POWDER, FOR SOLUTION ORAL at 11:55

## 2025-02-13 RX ADMIN — IOHEXOL 80 ML: 350 INJECTION, SOLUTION INTRAVENOUS at 02:04

## 2025-02-13 RX ADMIN — CEFTRIAXONE SODIUM 1 G: 1 INJECTION, SOLUTION INTRAVENOUS at 03:01

## 2025-02-13 RX ADMIN — SODIUM CHLORIDE, POTASSIUM CHLORIDE, SODIUM LACTATE AND CALCIUM CHLORIDE 1000 ML: 600; 310; 30; 20 INJECTION, SOLUTION INTRAVENOUS at 00:57

## 2025-02-13 RX ADMIN — RIVAROXABAN 20 MG: 20 TABLET, FILM COATED ORAL at 17:28

## 2025-02-13 RX ADMIN — BISACODYL 10 MG: 10 SUPPOSITORY RECTAL at 11:55

## 2025-02-13 RX ADMIN — LACTULOSE 10 G: 20 SOLUTION ORAL at 11:55

## 2025-02-13 NOTE — ED PROVIDER NOTES
History of Present Illness     History provided by: EMS  Limitations to History: Dementia  External Records Reviewed with Brief Summary:  Previous ED and hospital records for past medical history    HPI:  Niharika Pandya is a 89 y.o. female critical 70/40 ovn Alzheimer's dementia baseline A&Ox0-1, arthritis, HLD, DVT/PE on who presented as a critical by EMS after episode of unresponsiveness, family found her around not responding and not at her baseline.  She was agonal he breathing.  Per EMS her blood pressure was 70s over 40s.  Her blood sugar was within normal limit prehospital.  Further history limited by patients dementia.       Physical Exam   Triage vitals:  T 36.1 °C (97 °F)  HR 76  /79  RR 18  O2 97 % None (Room air)    General: Awake, alert, in no acute distress  Eyes: Gaze conjugate.  No scleral icterus or injection  HENT: Normo-cephalic, atraumatic. No stridor  CV: Regular rate, regular rhythm. Radial pulses 2+ bilaterally  Resp: Breathing non-labored  Clear to auscultation bilaterally  GI: Soft, non-distended, tender with guarding throughout.   : normal female external genetalia  MSK/Extremities: muscle wasting and b/l upper extremity contractures  Skin: Warm. Dry. Appropriate color  Neuro: Alert. GCS12 E4V2M6. Face symmetric. Expressive aphasia.  B/l UE contracture limiting strength testing, moving B/L lowers, globally weak. sensation intact in b/l UE and LEs  Psych: pleasently demented      Medical Decision Making & ED Course   Medical Decision Makin y.o. female with history of dementia and previous DVT PE who presents for evaluation of unresponsiveness at home.  Reports that she was found down agonal he breathing and was hypotensive for EMS.  She upon arrival is pleasantly demented does respond to name follow basic commands and is able to say name, which appears to be her baseline.  Her GCS is 12.  She has stable vitals no tachycardia afebrile no increased respiration rate not  requiring oxygen.  Her blood pressures are within normal limits.  Her EKG is nonischemic with left anterior fascicular block but no ischemic changes.  Her initial blood gas shows no significant derangement in acid-base status and mildly bumped lactate.  Patient appears mildly dehydrated.  Differential includes infectious including UTI she is tender abdomen and with agonal breathing episode could be other infection versus aspiration event versus recurrence of PE in setting of her bedbound and deconditioned status as on chart review it does not appear that she is still on blood thinners.  Given EKG less likely ACS though will pursue troponins and electrolytes to look for reason for potential transient arrhythmia that would contribute to patient's presentation today.  Overall patient is well-appearing here.  Unfortunately there is significant delays due to lab errors that led to delay in CT imaging.  Patient was given gentle fluids and chest x-ray and CT imaging will be obtained.  Initial CBC for the patient shows no leukocytosis making infectious less likely hemoglobin is at baseline.  There was a redraw that showed a hemoglobin of 6.7 that I believe is erroneous in nature.  Patient is not having active bleeding.  COVID and flu are negative for the patient.  Patient's troponins were stable.  Pending UA for rule out of UTI.    Social Determinants of Health which Significantly Impact Care: Transportation difficulties The following actions were taken to address these social determinants: Patient offered evaluation by Social Work    EKG Independent Interpretation: EKG interpreted by myself.  Sinus rhythm with left anterior fascicular block rate of 74 [place] interval 166 MS QRS 74 MS QTc 450 MS within normal limits no acute ST segment elevations or T wave inversions concerning for acute ischemia  Independent Result Review and Interpretation: Results were independently reviewed and interpreted by myself. Please see ED course  and MDM for full interpretation.    Chronic conditions affecting the patient's care: As documented in the MDM    The patient was discussed with the following consultants/services: None    Care Considerations: As per MDM    ED Course:  ED Course as of 02/13/25 0115 Wed Feb 12, 2025 2328 HEMOGLOBIN(!): 6.7 [RR]      ED Course User Index  [RR] Farzana Celeste MD         Diagnoses as of 02/13/25 0115   Episode of unresponsiveness     Disposition   Patient signed out to oncoming ED care team pending completion of her labs and imaging workup.  Pending discussion with family on goals of care if workup benign potentially home-going though overall likely anticipate admission for observation given episode at home.    Procedures   Procedures    Patient seen and discussed with ED attending physician.    Ora Delacruz DO  Emergency Medicine     Ora Delacruz DO  Resident  02/13/25 0116

## 2025-02-13 NOTE — SIGNIFICANT EVENT
Patient has been identified as having an emergent need for administration of iodinated contrast for CT scan prior to result of laboratory studies OR despite known elevated GFR due to possibility of life and/or limb threatening pathology.    I acknowledge the risks and benefits of emergently proceeding with contrast administration including that, at present, it is the position of the [employer] that contrast induced nephropathy (BRITNEY) is a rare but possible consequence. At this time the benefits of proceeding with contrast administration outweigh the risks.    Attempts will be made to mitigate possible BRITNEY risk with IV fluid hydration if able.    Ora Delacruz, DO  PGY-3 Emergency Medicine

## 2025-02-13 NOTE — PROGRESS NOTES
[facility]   [facility] [place]  INITIAL CONSULT NOTE       Reason For Consult  Transaminitis    SUBJECTIVE     History Of Present Illness    Ms. Pandya is a 89-year-old female with a past medical history of dementia (baseline A&O 0-1), deep venous thrombosis, pulmonary embolism, and pulmonary arterial hypertension who presented to [facility] on 2/12 after an episode of decreased responsiveness and gasping.  Per interview with patient's daughter who lives with her, Ms. Pandya was at home yesterday when she began gasping for air.  Ms. Pandya's daughter entered the room and found that she was unresponsive.  Ms. Pandya's daughter called an ambulance, with EMS reportedly finding her initial pressures to 70s over 40s.      Upon presentation to the emergency department, Ms. Pandya was afebrile with a heart rate of 76 and a blood pressure of 120/79, saturating to 97% on room air.  Ms. Pandya's initial CBC included a 9.9 WBC, 11.7 Hgb, 36.9 Htct, and 254 Plt.   She had a normal high sensitivity troponin and a urinanalysis notable for an elevated leukocyte esterase with negative urine nitrites, with microscopy showing >50 WBC per HPF.  Ms. Pandya has blood cultures pending.  She was given a 1 L bolus of lactate ringer and started on 1 g daily of ceftriaxone for empiric coverage of antibiotics.    Hepatology was consulted in regards to Ms. Pandya's transaminitis, with values of alkaline phosphatase 138, , , and total Bilirubin 0.8 at 12:57 AM today, and measurements of alkaline phosphatase 175 (H),  (H),  (H), and total bilirubin 1.0 at 5:42 AM this morning.  While Ms. Pandya appeared to have normal LFTs in 2020 and 2023, during an ED visit in July of 2024 for a UTI, her alkaline phosphatase was 281, her AST was 226, her ALT was 301, and her total bilirubin was 1.9.  Per ED note, Ms. Pandya was treated for cephalexin at this time for an e. coli UTI, with her  daughter confirming that this was the last time that she received antibiotics.  Ms. Pandya's daughter states that her mom does not take Tylenol or any supplements, confirming that she is just on a blood thinner and what she believes is a proton pump inhibitor, and that she has never known of a period in which she used IV drugs or drank heavily.    Review of Systems  As per HPI     Past Medical History:    Past Medical History:   Diagnosis Date    Personal history of other venous thrombosis and embolism 01/30/2020    History of deep venous thrombosis    Personal history of other venous thrombosis and embolism 09/13/2020    History of deep venous thrombosis    Personal history of pulmonary embolism 09/13/2020    History of pulmonary embolism    Unspecified dementia, unspecified severity, without behavioral disturbance, psychotic disturbance, mood disturbance, and anxiety (Multi) 11/22/2020    Dementia       Home Medications  (Not in a hospital admission)      Surgical History:    Past Surgical History:   Procedure Laterality Date    OTHER SURGICAL HISTORY  01/27/2020    Inferior vena cava filter placement       Allergies:  No Known Allergies    Social History:    Social History     Socioeconomic History    Marital status:      Spouse name: Not on file    Number of children: Not on file    Years of education: Not on file    Highest education level: Not on file   Occupational History    Not on file   Tobacco Use    Smoking status: Not on file    Smokeless tobacco: Not on file   Substance and Sexual Activity    Alcohol use: Not on file    Drug use: Not on file    Sexual activity: Not on file   Other Topics Concern    Not on file   Social History Narrative    Not on file     Social Drivers of Health     Financial Resource Strain: Not on file   Food Insecurity: Not on file   Transportation Needs: Not on file   Physical Activity: Not on file   Stress: Not on file   Social Connections: Not on file   Intimate Partner  "Violence: Not on file   Housing Stability: Not on file       Family History:  No family history on file.    EXAM     Vitals:    Vitals:    02/12/25 2108 02/12/25 2316 02/13/25 0320 02/13/25 0811   BP: 120/79 140/78 169/69 153/76   BP Location: Right arm  Right arm Right arm   Patient Position: Sitting  Lying Lying   Pulse: 76 88 (!) 112 74   Resp: 18 16 16 17   Temp: 36.1 °C (97 °F)  36.3 °C (97.3 °F) 36.6 °C (97.9 °F)   TempSrc: Temporal  Oral Temporal   SpO2: 97% 99% 100% 100%   Weight: 57.6 kg (127 lb)      Height: 1.6 m (5' 3\")        Failed to redirect to the Timeline version of the [employer] SmartLink.  No intake or output data in the 24 hours ending 02/13/25 0825      Physical Exam  General: Appears thin.  In no acute distress.  HEENT: Extraocular movements intact.  No scleral icterus.  Respiratory: Lungs clear bilaterally to auscultation.  Normal work of breathing on room air.  Cardiovascular: Regular rate and rhythm.  No murmurs, gallops, or rubs.  Abdomen: Soft, non-tender, non-distended.  Extremities: No edema noted.  Neuro: Alert but not oriented to person, place, time, or situation.  Cranial nerves grossly intact.  Moves all four extremities with no focal deficits.    OBJECTIVE                                                                              Medications       Current Facility-Administered Medications:     bisacodyl (Dulcolax) suppository 10 mg, 10 mg, rectal, Once, Mehnaz Kaplan DO    [START ON 2/14/2025] cefTRIAXone (Rocephin) 1 g in dextrose (iso) IV 50 mL, 1 g, intravenous, q24h, Trell Mendiola MD    docusate sodium (Colace) oral liquid 100 mg, 100 mg, oral, Daily, Trell Mendiola MD    lactulose 20 gram/30 mL oral solution 10 g, 10 g, oral, BID, Trell Mendiola MD    polyethylene glycol (Glycolax, Miralax) packet 17 g, 17 g, oral, Daily, Trell Mendiola MD    Current Outpatient Medications:     acetaminophen (Tylenol) 500 mg tablet, Take by mouth every 6 hours if needed., Disp: , Rfl:     ascorbic " acid (Vitamin C) 500 mg tablet, Take 1 tablet (500 mg) by mouth once daily., Disp: , Rfl:     docusate sodium (Colace) 50 mg/5 mL oral liquid, Take by mouth., Disp: , Rfl:     ferrous sulfate 8.8 mg/mL elemental iron elixir, Take by mouth., Disp: , Rfl:     fluticasone (Flonase) 50 mcg/actuation nasal spray, Administer 1 spray into affected nostril(s) 2 times a day., Disp: , Rfl:     furosemide (Lasix) 20 mg tablet, Take 1 tablet (20 mg) by mouth once daily as needed., Disp: , Rfl:     lactulose 20 gram/30 mL oral solution, Take by mouth once daily., Disp: , Rfl:     lidocaine (Lidoderm) 5 % patch, APPLY 1 PATCH TO THE AFFECTED AREA AND LEAVE IN PLACE FOR 12 HOURS, THEN REMOVE AND LEAVE OFF FOR 12 HOURS., Disp: , Rfl:     melatonin 3 mg capsule, Take by mouth., Disp: , Rfl:     multivitamin-min-iron-FA-vit K (Adults Multivitamin) 18 mg iron-400 mcg-25 mcg tablet, Take 1 tablet by mouth once daily., Disp: , Rfl:     warfarin (Coumadin) 2.5 mg tablet, Take by mouth., Disp: , Rfl:     warfarin (Coumadin) 3 mg tablet, Take by mouth., Disp: , Rfl:     warfarin (Coumadin) 4 mg tablet, Take by mouth., Disp: , Rfl:                                                                             Labs     Results for orders placed or performed during the hospital encounter of 02/12/25 (from the past 24 hours)   POCT GLUCOSE   Result Value Ref Range    POCT Glucose 115 (H) 74 - 99 mg/dL   Sars-CoV-2 and Influenza A/B PCR   Result Value Ref Range    Flu A Result Not Detected Not Detected    Flu B Result Not Detected Not Detected    Coronavirus 2019, PCR Not Detected Not Detected   Light Blue Top   Result Value Ref Range    Extra Tube Hold for add-ons.    CBC and Auto Differential   Result Value Ref Range    WBC 9.9 4.4 - 11.3 x10*3/uL    nRBC 0.0 0.0 - 0.0 /100 WBCs    RBC 5.24 (H) 4.00 - 5.20 x10*6/uL    Hemoglobin 11.7 (L) 12.0 - 16.0 g/dL    Hematocrit 36.9 36.0 - 46.0 %    MCV 70 (L) 80 - 100 fL    MCH 22.3 (L) 26.0 - 34.0 pg     MCHC 31.7 (L) 32.0 - 36.0 g/dL    RDW 15.4 (H) 11.5 - 14.5 %    Platelets 254 150 - 450 x10*3/uL    Neutrophils % 59.6 40.0 - 80.0 %    Immature Granulocytes %, Automated 0.4 0.0 - 0.9 %    Lymphocytes % 30.4 13.0 - 44.0 %    Monocytes % 8.5 2.0 - 10.0 %    Eosinophils % 0.8 0.0 - 6.0 %    Basophils % 0.3 0.0 - 2.0 %    Neutrophils Absolute 5.92 (H) 1.60 - 5.50 x10*3/uL    Immature Granulocytes Absolute, Automated 0.04 0.00 - 0.50 x10*3/uL    Lymphocytes Absolute 3.02 (H) 0.80 - 3.00 x10*3/uL    Monocytes Absolute 0.84 (H) 0.05 - 0.80 x10*3/uL    Eosinophils Absolute 0.08 0.00 - 0.40 x10*3/uL    Basophils Absolute 0.03 0.00 - 0.10 x10*3/uL   Troponin I, High Sensitivity, Initial   Result Value Ref Range    Troponin I, High Sensitivity (CMC) 4 0 - 34 ng/L   Blood Gas Venous Full Panel Unsolicited   Result Value Ref Range    POCT pH, Venous 7.33 7.33 - 7.43 pH    POCT pCO2, Venous 52 (H) 41 - 51 mm Hg    POCT pO2, Venous 27 (L) 35 - 45 mm Hg    POCT SO2, Venous 31 (L) 45 - 75 %    POCT Oxy Hemoglobin, Venous 30.7 (L) 45.0 - 75.0 %    POCT Hematocrit Calculated, Venous 38.0 36.0 - 46.0 %    POCT Sodium, Venous 139 136 - 145 mmol/L    POCT Potassium, Venous 4.4 3.5 - 5.3 mmol/L    POCT Chloride, Venous 107 98 - 107 mmol/L    POCT Ionized Calicum, Venous 1.20 1.10 - 1.33 mmol/L    POCT Glucose, Venous 124 (H) 74 - 99 mg/dL    POCT Lactate, Venous 2.3 (H) 0.4 - 2.0 mmol/L    POCT Base Excess, Venous 0.6 -2.0 - 3.0 mmol/L    POCT HCO3 Calculated, Venous 27.4 (H) 22.0 - 26.0 mmol/L    POCT Hemoglobin, Venous 12.8 12.0 - 16.0 g/dL    POCT Anion Gap, Venous 9.0 (L) 10.0 - 25.0 mmol/L    Patient Temperature 37.0 degrees Celsius   ECG 12 lead   Result Value Ref Range    Ventricular Rate 74 BPM    Atrial Rate 74 BPM    MN Interval 166 ms    QRS Duration 74 ms    QT Interval 406 ms    QTC Calculation(Bazett) 450 ms    P Axis 47 degrees    R Axis -46 degrees    T Axis 22 degrees    QRS Count 13 beats    Q Onset 224 ms    P Onset  141 ms    P Offset 185 ms    T Offset 427 ms    QTC Fredericia 435 ms   Troponin, High Sensitivity, 1 Hour   Result Value Ref Range    Troponin I, High Sensitivity (CMC) 4 0 - 34 ng/L   CBC and Auto Differential   Result Value Ref Range    WBC 6.3 4.4 - 11.3 x10*3/uL    nRBC 0.0 0.0 - 0.0 /100 WBCs    RBC 2.86 (L) 4.00 - 5.20 x10*6/uL    Hemoglobin 6.7 (L) 12.0 - 16.0 g/dL    Hematocrit 20.5 (L) 36.0 - 46.0 %    MCV 72 (L) 80 - 100 fL    MCH 23.4 (L) 26.0 - 34.0 pg    MCHC 32.7 32.0 - 36.0 g/dL    RDW 15.3 (H) 11.5 - 14.5 %    Platelets 167 150 - 450 x10*3/uL    Neutrophils % 73.2 40.0 - 80.0 %    Immature Granulocytes %, Automated 0.6 0.0 - 0.9 %    Lymphocytes % 18.4 13.0 - 44.0 %    Monocytes % 7.3 2.0 - 10.0 %    Eosinophils % 0.3 0.0 - 6.0 %    Basophils % 0.2 0.0 - 2.0 %    Neutrophils Absolute 4.62 1.60 - 5.50 x10*3/uL    Immature Granulocytes Absolute, Automated 0.04 0.00 - 0.50 x10*3/uL    Lymphocytes Absolute 1.16 0.80 - 3.00 x10*3/uL    Monocytes Absolute 0.46 0.05 - 0.80 x10*3/uL    Eosinophils Absolute 0.02 0.00 - 0.40 x10*3/uL    Basophils Absolute 0.01 0.00 - 0.10 x10*3/uL   Morphology   Result Value Ref Range    RBC Morphology See Below     Hypochromia Mild     Target Cells Few     Ovalocytes Few    Blood Culture    Specimen: Peripheral Venipuncture; Blood culture   Result Value Ref Range    Blood Culture Loaded on Instrument - Culture in progress    Blood Culture    Specimen: Peripheral Venipuncture; Blood culture   Result Value Ref Range    Blood Culture Loaded on Instrument - Culture in progress    Comprehensive metabolic panel   Result Value Ref Range    Glucose 128 (H) 74 - 99 mg/dL    Sodium 138 136 - 145 mmol/L    Potassium 3.7 3.5 - 5.3 mmol/L    Chloride 108 (H) 98 - 107 mmol/L    Bicarbonate 23 21 - 32 mmol/L    Anion Gap 11 10 - 20 mmol/L    Urea Nitrogen 17 6 - 23 mg/dL    Creatinine 0.68 0.50 - 1.05 mg/dL    eGFR 83 >60 mL/min/1.73m*2    Calcium 8.2 (L) 8.6 - 10.6 mg/dL    Albumin 3.3  (L) 3.4 - 5.0 g/dL    Alkaline Phosphatase 138 (H) 33 - 136 U/L    Total Protein 6.4 6.4 - 8.2 g/dL     (H) 9 - 39 U/L    Bilirubin, Total 0.8 0.0 - 1.2 mg/dL     (H) 7 - 45 U/L   Magnesium   Result Value Ref Range    Magnesium 2.07 1.60 - 2.40 mg/dL   Urinalysis with Reflex Culture and Microscopic   Result Value Ref Range    Color, Urine Light-Orange (N) Light-Yellow, Yellow, Dark-Yellow    Appearance, Urine Ex.Turbid (N) Clear    Specific Gravity, Urine 1.016 1.005 - 1.035    pH, Urine 6.5 5.0, 5.5, 6.0, 6.5, 7.0, 7.5, 8.0    Protein, Urine 30 (1+) (A) NEGATIVE, 10 (TRACE), 20 (TRACE) mg/dL    Glucose, Urine Normal Normal mg/dL    Blood, Urine 0.2 (2+) (A) NEGATIVE mg/dL    Ketones, Urine NEGATIVE NEGATIVE mg/dL    Bilirubin, Urine NEGATIVE NEGATIVE mg/dL    Urobilinogen, Urine 4 (2+) (A) Normal mg/dL    Nitrite, Urine NEGATIVE NEGATIVE    Leukocyte Esterase, Urine 500 Bhavin/uL (A) NEGATIVE   Microscopic Only, Urine   Result Value Ref Range    WBC, Urine >50 (A) 1-5, NONE /HPF    WBC Clumps, Urine MANY Reference range not established. /HPF    RBC, Urine >20 (A) NONE, 1-2, 3-5 /HPF    Squamous Epithelial Cells, Urine 1-9 (SPARSE) Reference range not established. /HPF    Mucus, Urine FEW Reference range not established. /LPF   CBC and Auto Differential   Result Value Ref Range    WBC 7.1 4.4 - 11.3 x10*3/uL    nRBC 0.0 0.0 - 0.0 /100 WBCs    RBC 4.88 4.00 - 5.20 x10*6/uL    Hemoglobin 11.1 (L) 12.0 - 16.0 g/dL    Hematocrit 34.9 (L) 36.0 - 46.0 %    MCV 72 (L) 80 - 100 fL    MCH 22.7 (L) 26.0 - 34.0 pg    MCHC 31.8 (L) 32.0 - 36.0 g/dL    RDW 15.0 (H) 11.5 - 14.5 %    Platelets 262 150 - 450 x10*3/uL    Neutrophils % 50.5 40.0 - 80.0 %    Immature Granulocytes %, Automated 0.4 0.0 - 0.9 %    Lymphocytes % 40.0 13.0 - 44.0 %    Monocytes % 8.2 2.0 - 10.0 %    Eosinophils % 0.6 0.0 - 6.0 %    Basophils % 0.3 0.0 - 2.0 %    Neutrophils Absolute 3.59 1.60 - 5.50 x10*3/uL    Immature Granulocytes Absolute,  Automated 0.03 0.00 - 0.50 x10*3/uL    Lymphocytes Absolute 2.84 0.80 - 3.00 x10*3/uL    Monocytes Absolute 0.58 0.05 - 0.80 x10*3/uL    Eosinophils Absolute 0.04 0.00 - 0.40 x10*3/uL    Basophils Absolute 0.02 0.00 - 0.10 x10*3/uL   Hepatitis panel, acute   Result Value Ref Range    Hepatitis B Surface AG Nonreactive Nonreactive    Hepatitis A  AB- IgM Nonreactive Nonreactive    Hepatitis B Core AB; IgM Nonreactive Nonreactive    Hepatitis C AB Nonreactive Nonreactive   Comprehensive Metabolic Panel   Result Value Ref Range    Glucose 103 (H) 74 - 99 mg/dL    Sodium 139 136 - 145 mmol/L    Potassium 3.8 3.5 - 5.3 mmol/L    Chloride 107 98 - 107 mmol/L    Bicarbonate 23 21 - 32 mmol/L    Anion Gap 13 10 - 20 mmol/L    Urea Nitrogen 15 6 - 23 mg/dL    Creatinine 0.59 0.50 - 1.05 mg/dL    eGFR 86 >60 mL/min/1.73m*2    Calcium 8.3 (L) 8.6 - 10.6 mg/dL    Albumin 3.2 (L) 3.4 - 5.0 g/dL    Alkaline Phosphatase 175 (H) 33 - 136 U/L    Total Protein 6.5 6.4 - 8.2 g/dL     (H) 9 - 39 U/L    Bilirubin, Total 1.0 0.0 - 1.2 mg/dL     (H) 7 - 45 U/L                                                                              Imaging           CT Angio Chest for Pulmonary Embolism / CT Abdomen/Pelvis with IV Contrast - 2/13/2025    IMPRESSION:  CTA Chest:  1. No acute pulmonary embolism or other acute cardiopulmonary  process. Specifically, no evidence of aspiration.          CT Abdomen Pelvis:  1. Large amount of stool in the rectum similar but less pronounced  compared to prior study consistent with chronic fecal impaction.  2. Small bowel containing left inguinal hernia without obstruction or  strangulation, new from prior study.  3. Increased induration of the soft tissues overlying the distal  coccyx, to be correlated with concern for underlying decubitus ulcer.  No osseous destruction or erosive changes.  4. Severe osteopenia.  5. Additional incidental non-acute findings as detailed above.          I  personally reviewed the images/study and I agree with the findings  as stated by Dr. Gautam Irby. This study was interpreted at [place], [place].    Ultrasound Right Upper Quadrant - 2/13/2025    IMPRESSION:  1. Increased echogenicity of liver suggestive of fatty infiltration.  2. Cholelithiasis without evident acute cholecystitis.      I personally reviewed the images/study and I agree with the resident  findings as stated by Gabriela Spicer MD. This study was interpreted at  [place], [place].        ASSESSMENT / PLAN                  ASSESSMENT/PLAN:    Ms. Pandya is a 89-year-old female with a past medical history of dementia (baseline A&O 0-1), deep venous thrombosis, pulmonary embolism, and pulmonary arterial hypertension who presented to [facility] on 2/12 after an episode of decreased responsiveness and gasping.      Ms. Pandya's AST and ALT are both markedly elevated, with her AST trending up from 564 at 12:57 AM to 844 at 5:42 AM, with her ALT increasing from 403 to 710 over that time.  Notably, Ms. Pandya's AST and ALT were both elevated during an ED visit in July, to 226 and 301, respectively, with no previous elevations on chart review dating back to 2020.  Notably, Mr. Pandya has not had any LFTs checked between July and this morning, making it unclear to what extend this elevation ever resolved.    Ms. Pandya has had a thorough work-up done so far, including an acute toxicology panel that was negative for elevations in acetaminophen, salicylates, or alcohol, as well as a negative acute hepatitis panel.  Ms. Pandya also has not had antibiotics since last summer, and her daughter believes that she has never been a heavy drinker.  She also had a right upper quadrant ultrasound that showed some increased echogenicity of the liver suggestive of fatty infiltration as well as cholelithiasis without evidence of acute  cholecystitis.  She also has had an autoimmune work-up initiated that includes a negative MELODY.    Given this, we think the most likely etiology of Ms. Pandya's transaminitis is an ischemic hepatitis secondary to her hypotensive episode documented as being in the 70s/40s with EMS.  However, Ms. Pandya notably doesn't have an HARSHA, with a current creatinine of 0.59, which we would typically expect accompanying such ischemic hepatitis episodes.  Ms. Pandya also appears to have a UTI, with positive leukocyte esterase in her urinalysis as well as > 50 WBC per HPF on microscopy.  While she is currently being treated with ceftriaxone, this infection is considered possibly the original source of her hypotension.    Given the fact that Ms. Pandya has been normotensive since she has been at the hospital, we expect her AST and ALT to trend down relatively quickly with the resolution of her presumed ischemia.  At this time, our plan is centered around following daily hepatic function panels for monitoring of trends in her AST, ALT, and alkaline phosphatase.     Plan:  -Continue to follow patient with daily hepatic function panels    Patient was seen and discussed with Dr. Sifuentes    Thank you for this interesting consult. Gastroenterology will continue to follow.  -During weekday hours of 7am-5pm please do not hesitate to contact me on [employer] Chat or page [phone] if there are any further questions between the weekday hours of 7 AM - 5 PM.   -After hours, on weekends, and on holidays, please page the [place] GI fellow [place] [phone]. Thank you.    Mika Silveira, M4

## 2025-02-13 NOTE — H&P
HPI:   Patient is a 89-year-old female with PMH of DVT, PE, PAH, Dementia presenting to the ED by EMS after being found unresponsive at home. Family not at bedside and patient unable to provide history, so history obtained from ED staff and chart. Reportedly, the family found the patient unresponsive at home; at baseline she is oriented to self only and this episode was worse than her normal. She was also noted to have difficulty breathing. Per EMS, her BP was 70/40. On arrival to [facility] [place], [place], her BP was within normal limits with no intervention.      On my assessment, the patient does not know where she is. She does not appear to be in acute distress and does not indicate she is in any pain. She appears to be at historical baseline mental status.     On presentation to the ED:   VS: T 36.1 P 76 /79 -> 169/69 97% on RA     Labs:   CBC: 9.9/11.7/254 -> 6.3/6.7/167   RFP: 138/3.7/108/23/17/0.68<128      Bili 0.8   Blood and Urine cultures collected   VBG: pH 7.33 pCO2 52 Lactate 2.3   UA: 1+ protein, +LE, WBC and RBCs     CT Angio:   IMPRESSION:   CTA Chest:   1. No acute pulmonary embolism or other acute cardiopulmonary   process. Specifically, no evidence of aspiration.         CT Abdomen Pelvis:   1. Large amount of stool in the rectum similar but less pronounced   compared to prior study consistent with chronic fecal impaction.   2. Small bowel containing left inguinal hernia without obstruction or   strangulation, new from prior study.   3. Increased induration of the soft tissues overlying the distal   coccyx, to be correlated with concern for underlying decubitus ulcer.   No osseous destruction or erosive changes.   4. Severe osteopenia.   5. Additional incidental non-acute findings as detailed above.     CT HEAD:   1. No acute intracranial abnormality.   2. Severe supratentorial chronic ischemic changes and supratentorial   volume loss bifrontal and temporal predominance, slightly  "progressed.     CT CERVICAL SPINE:   1. No acute fracture or traumatic malalignment of the cervical spine.   2. Multilevel spondylosis of the cervical spine resulting in varying   degrees of spinal canal and neural foraminal stenosis as described   above.     ED Interventions:   1L LR   CTX 1g      /69 (BP Location: Right arm, Patient Position: Lying)   Pulse (!) 112   Temp 36.3 °C (97.3 °F) (Oral)   Resp 16   Ht 1.6 m (5' 3\")   Wt 57.6 kg (127 lb)   SpO2 100%   BMI 22.50 kg/m²       Physical Exam:     GEN: Oriented to self only, no acute distress   EYES: EOMI, non-injected sclera.   CARDIO: Normal rate and regular rhythm. No murmurs, rubs, or gallops.     PULM: LCTAB   GI: Soft, non-tender, non-distended   SKIN: Warm and dry, no rashes or lesions.   EXT: No peripheral edema, contusions, or wounds.    NEURO: Cranial nerves II-XII grossly intact.      Assessment and Plan:   Patient is a 89-year-old female with PMH of DVT, PE, PAH, Dementia presenting to the ED by EMS after being found unresponsive at home, with resolution of symptoms by the time she arrived in the ED. Initial workup notable for UA concerning for UTI and LFTs worsened over prior. She also had two CBCs with inconsistent results. Treatment was initiated for UTI by the ED provider. As patient seems at baseline but no real context from family or caregivers, will continue to treat empirically for UTI for now. Will also work up the transaminitis.     #UTI   -continue Ceftriaxone 1g daily   - f/u urine and blood culture     #Dementia   :: mental status appears at baseline per past providers documentation; No acute findings on head imaging  -obtain collateral information in the morning     #Transaminitis; hepatocellular pattern    :: LFTs were normal last year, first noted to be elevated 7/2024  - RUQ US   - f/u Hepatitis panel    - f/u MELODY, Anti-smooth muscle ab     #CV   -hold Furosemide     #DVT/PE   :: inital HgB 11.7, repeat was 6.7 with no " observed bleeding   -hold Warfarin in the setting of acute drop in HgB   - f/u repeat CBC to determine if drop was real.     Bowel regimen - MIralax, Lactulose BID, Docusate daily   DVT: holding for now   Code status - FULL CODE; family not present

## 2025-02-13 NOTE — ED TRIAGE NOTES
Pt presents to the ED via [facility] d/t hypotension. Pt's daughter found her unresponsive and gasping for air and called 911. Pt has a hx of advanced dementia and is unable to answer questions. Daughter unable to state past medical hx.

## 2025-02-13 NOTE — PROGRESS NOTES
Name: Niharika Pandya  MRN: [id number]  Age: 89 y.o.      Date of Admission:  2/12/2025      Subjective     Patient appear comfortable, lying in bed. Speaks softly and generally gives appropriate answers. Denies pain or SOB. No dysuria.    Objective     Vitals:    02/13/25 0320 02/13/25 0811 02/13/25 0921 02/13/25 1215   BP: 169/69 153/76 141/76 146/72   BP Location: Right arm Right arm  Right arm   Patient Position: Lying Lying  Lying   Pulse: (!) 112 74 68 62   Resp: 16 17 16 18   Temp: 36.3 °C (97.3 °F) 36.6 °C (97.9 °F)  36.4 °C (97.5 °F)   TempSrc: Oral Temporal  Temporal   SpO2: 100% 100% 100% 100%   Weight:       Height:          No intake/output data recorded.  No intake/output data recorded.    Wt Readings from Last 3 Encounters:   02/12/25 57.6 kg (127 lb)   07/24/24 57.6 kg (127 lb)   01/27/20 57.6 kg (127 lb)     Body mass index is 22.5 kg/m².    Physical Exam     Gen: Chronically ill, nontoxic   Head/Neck: normocephalic, atraumatic  Eyes: anicteric sclerae, noninjected conjunctivae  Mouth:  MMM  Heart: RRR, no murmurs, rubs, or gallops  Lungs: No increased work of breathing, lungs clear bilaterally, no wheezing, crackles, rhonchi  Abdomen: soft, NT, ND, no HSM, no palpable masses, good bowel sounds  Musculoskeletal: no joint swelling  Extremities: WWP, cap refill <2sec  Neurologic: Alert but not oriented to person, place, or situation, symmetrical facies, phonates clearly, moves all extremities equally, responsive to touch  Skin: no rashes  Psychological: appropriate mood/affect          Labs    Results from last 7 days   Lab Units 02/13/25  0542 02/13/25  0057   SODIUM mmol/L 139 138   POTASSIUM mmol/L 3.8 3.7   CHLORIDE mmol/L 107 108*   CO2 mmol/L 23 23   BUN mg/dL 15 17   CREATININE mg/dL 0.59 0.68   GLUCOSE mg/dL 103* 128*   CALCIUM mg/dL 8.3* 8.2*      Results from last 7 days   Lab Units 02/13/25  0444 02/12/25  2251 02/12/25  2119   WBC AUTO x10*3/uL 7.1 6.3 9.9   HEMOGLOBIN g/dL 11.1* 6.7* 11.7*    HEMATOCRIT % 34.9* 20.5* 36.9   PLATELETS AUTO x10*3/uL 262 167 254            Microbiology  No results found for the last 90 days.       Radiology  CT head wo IV contrast   Final Result   CT HEAD:   1. No acute intracranial abnormality.   2. Severe supratentorial chronic ischemic changes and supratentorial   volume loss bifrontal and temporal predominance, slightly progressed.             CT CERVICAL SPINE:   1. No acute fracture or traumatic malalignment of the cervical spine.   2. Multilevel spondylosis of the cervical spine resulting in varying   degrees of spinal canal and neural foraminal stenosis as described   above.             I personally reviewed the images/study and I agree with the findings   as stated by Dr. Gautam Irby. This study was interpreted at [place], [place].        MACRO:   None.        Signed by: Uday Joaquin 2/13/2025 2:53 AM   Dictation workstation:   [id number]      CT cervical spine wo IV contrast   Final Result   CT HEAD:   1. No acute intracranial abnormality.   2. Severe supratentorial chronic ischemic changes and supratentorial   volume loss bifrontal and temporal predominance, slightly progressed.             CT CERVICAL SPINE:   1. No acute fracture or traumatic malalignment of the cervical spine.   2. Multilevel spondylosis of the cervical spine resulting in varying   degrees of spinal canal and neural foraminal stenosis as described   above.             I personally reviewed the images/study and I agree with the findings   as stated by Dr. Gautam Irby. This study was interpreted at [place], [place].        MACRO:   None.        Signed by: Uday Joaquin 2/13/2025 2:53 AM   Dictation workstation:   [id number]      CT thoracic spine wo IV contrast   Final Result   CT thoracic spine:   1. No acute fracture or malalignment.   2. Mild spondylotic changes without significant canal  stenosis.             CT lumbar spine:   1. No acute fracture or malalignment.   2. Mild spondylotic change without significant canal or neural   foraminal stenosis.             I personally reviewed the images/study and I agree with the findings   as stated by Dr. Gautam Irby. This study was interpreted at [place], [place].        MACRO:   None.        Signed by: Uday Joaquin 2/13/2025 3:11 AM   Dictation workstation:   [id number]      CT lumbar spine wo IV contrast   Final Result   CT thoracic spine:   1. No acute fracture or malalignment.   2. Mild spondylotic changes without significant canal stenosis.             CT lumbar spine:   1. No acute fracture or malalignment.   2. Mild spondylotic change without significant canal or neural   foraminal stenosis.             I personally reviewed the images/study and I agree with the findings   as stated by Dr. Gautam Irby. This study was interpreted at [place], [place].        MACRO:   None.        Signed by: Uday Joaquin 2/13/2025 3:11 AM   Dictation workstation:   [id number]      CT abdomen pelvis w IV contrast   Final Result   CTA Chest:   1. No acute pulmonary embolism or other acute cardiopulmonary   process. Specifically, no evidence of aspiration.             CT Abdomen Pelvis:   1. Large amount of stool in the rectum similar but less pronounced   compared to prior study consistent with chronic fecal impaction.   2. Small bowel containing left inguinal hernia without obstruction or   strangulation, new from prior study.   3. Increased induration of the soft tissues overlying the distal   coccyx, to be correlated with concern for underlying decubitus ulcer.   No osseous destruction or erosive changes.   4. Severe osteopenia.   5. Additional incidental non-acute findings as detailed above.             I personally reviewed the images/study and I agree with the  findings   as stated by Dr. Gautam Irby. This study was interpreted at [place], [place].        MACRO:   None.        Signed by: Uday Joaquin 2/13/2025 3:05 AM   Dictation workstation:   [id number]      CT angio chest for pulmonary embolism   Final Result   CTA Chest:   1. No acute pulmonary embolism or other acute cardiopulmonary   process. Specifically, no evidence of aspiration.             CT Abdomen Pelvis:   1. Large amount of stool in the rectum similar but less pronounced   compared to prior study consistent with chronic fecal impaction.   2. Small bowel containing left inguinal hernia without obstruction or   strangulation, new from prior study.   3. Increased induration of the soft tissues overlying the distal   coccyx, to be correlated with concern for underlying decubitus ulcer.   No osseous destruction or erosive changes.   4. Severe osteopenia.   5. Additional incidental non-acute findings as detailed above.             I personally reviewed the images/study and I agree with the findings   as stated by Dr. Gautam Irby. This study was interpreted at [place], [place].        MACRO:   None.        Signed by: Uday Joaquin 2/13/2025 3:05 AM   Dictation workstation:   [id number]      XR chest 2 views   Final Result   1. No acute cardiopulmonary process.             I personally reviewed the images/study and I agree with the findings   as stated by Dr. Gautam Irby. This study was interpreted at [place], [place].        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 Differential diagnoses for this is UTI vs transient hypotension ( per EMS report, however normotensive iv ED). Also noted to have worsening transaminitis with hepatocellular pattern of injury . Currently requiring inpatient admission for IV antibiotics for UTI and further workup for hepatic injury with assistance of hepatology.      #Transaminitis   Patient was noted to have elevated liver enzymes to  and  alk phos 175, previously 564/403 in February. Pattern of liver injury is hepatocellular. No clear drug triggers. Potentially from hypotension at home if was 70/30.    Plan   - Follow up RUQ ultrasound   - Trend LFTs  - Appreciated hepatology recommendations     #UTI   Patient noted to have +WBCs and leukesterase on urine sample. She has previously grown ampicillin resistant E Coli. No culture data yet. Uncomplicated.   Plan   - Continue ceftriaxone 1g q24 hours   - Follow up culture     #End stage dementia  Patient appears to be at her baseline at this time (AAOx0- AAOx1). AMS previously likely related to UTI. Nonfocal exam.   - Consider geriatrics if change in status   - Delirium precautions     #DVT/PE   :: inital HgB 11.7, repeat was 6.7 with no observed bleeding --> 11.1 ( likely error)  -Continue home xarelto 20mg daily     #GERD   - Continue home pantoprazole     I spent 45 minutes in the care of this patient including calling family, chart review, examining patient, and appropriate documentation and clinical results.

## 2025-02-14 ENCOUNTER — PHARMACY VISIT (OUTPATIENT)
Dept: PHARMACY | Facility: CLINIC | Age: OVER 89
End: 2025-02-14
Payer: COMMERCIAL

## 2025-02-14 VITALS
WEIGHT: 127 LBS | RESPIRATION RATE: 14 BRPM | SYSTOLIC BLOOD PRESSURE: 133 MMHG | HEIGHT: 63 IN | BODY MASS INDEX: 22.5 KG/M2 | OXYGEN SATURATION: 99 % | DIASTOLIC BLOOD PRESSURE: 72 MMHG | HEART RATE: 80 BPM | TEMPERATURE: 96.4 F

## 2025-02-14 LAB
ALBUMIN SERPL BCP-MCNC: 3.2 G/DL (ref 3.4–5)
ALP SERPL-CCNC: 187 U/L (ref 33–136)
ALT SERPL W P-5'-P-CCNC: 458 U/L (ref 7–45)
ANION GAP SERPL CALC-SCNC: 14 MMOL/L (ref 10–20)
AST SERPL W P-5'-P-CCNC: 261 U/L (ref 9–39)
BASOPHILS # BLD AUTO: 0.03 X10*3/UL (ref 0–0.1)
BASOPHILS NFR BLD AUTO: 0.4 %
BILIRUB SERPL-MCNC: 0.7 MG/DL (ref 0–1.2)
BUN SERPL-MCNC: 11 MG/DL (ref 6–23)
CALCIUM SERPL-MCNC: 8.3 MG/DL (ref 8.6–10.6)
CHLORIDE SERPL-SCNC: 109 MMOL/L (ref 98–107)
CO2 SERPL-SCNC: 20 MMOL/L (ref 21–32)
CREAT SERPL-MCNC: 0.61 MG/DL (ref 0.5–1.05)
EGFRCR SERPLBLD CKD-EPI 2021: 85 ML/MIN/1.73M*2
EOSINOPHIL # BLD AUTO: 0.23 X10*3/UL (ref 0–0.4)
EOSINOPHIL NFR BLD AUTO: 3.3 %
ERYTHROCYTE [DISTWIDTH] IN BLOOD BY AUTOMATED COUNT: 15.1 % (ref 11.5–14.5)
GLUCOSE BLD MANUAL STRIP-MCNC: 84 MG/DL (ref 74–99)
GLUCOSE SERPL-MCNC: 66 MG/DL (ref 74–99)
HCT VFR BLD AUTO: 34.3 % (ref 36–46)
HGB BLD-MCNC: 11.5 G/DL (ref 12–16)
IMM GRANULOCYTES # BLD AUTO: 0.03 X10*3/UL (ref 0–0.5)
IMM GRANULOCYTES NFR BLD AUTO: 0.4 % (ref 0–0.9)
LYMPHOCYTES # BLD AUTO: 2.56 X10*3/UL (ref 0.8–3)
LYMPHOCYTES NFR BLD AUTO: 37.2 %
MCH RBC QN AUTO: 23 PG (ref 26–34)
MCHC RBC AUTO-ENTMCNC: 33.5 G/DL (ref 32–36)
MCV RBC AUTO: 69 FL (ref 80–100)
MONOCYTES # BLD AUTO: 0.97 X10*3/UL (ref 0.05–0.8)
MONOCYTES NFR BLD AUTO: 14.1 %
NEUTROPHILS # BLD AUTO: 3.07 X10*3/UL (ref 1.6–5.5)
NEUTROPHILS NFR BLD AUTO: 44.6 %
NRBC BLD-RTO: 0 /100 WBCS (ref 0–0)
PLATELET # BLD AUTO: 245 X10*3/UL (ref 150–450)
POTASSIUM SERPL-SCNC: 4 MMOL/L (ref 3.5–5.3)
PROT SERPL-MCNC: 6.5 G/DL (ref 6.4–8.2)
RBC # BLD AUTO: 4.99 X10*6/UL (ref 4–5.2)
SMOOTH MUSCLE AB SER QL IF: ABNORMAL
SODIUM SERPL-SCNC: 139 MMOL/L (ref 136–145)
WBC # BLD AUTO: 6.9 X10*3/UL (ref 4.4–11.3)

## 2025-02-14 PROCEDURE — 82947 ASSAY GLUCOSE BLOOD QUANT: CPT

## 2025-02-14 PROCEDURE — 2500000004 HC RX 250 GENERAL PHARMACY W/ HCPCS (ALT 636 FOR OP/ED)

## 2025-02-14 PROCEDURE — 84075 ASSAY ALKALINE PHOSPHATASE: CPT | Performed by: STUDENT IN AN ORGANIZED HEALTH CARE EDUCATION/TRAINING PROGRAM

## 2025-02-14 PROCEDURE — 85025 COMPLETE CBC W/AUTO DIFF WBC: CPT | Performed by: STUDENT IN AN ORGANIZED HEALTH CARE EDUCATION/TRAINING PROGRAM

## 2025-02-14 PROCEDURE — RXMED WILLOW AMBULATORY MEDICATION CHARGE

## 2025-02-14 PROCEDURE — 99239 HOSP IP/OBS DSCHRG MGMT >30: CPT | Performed by: STUDENT IN AN ORGANIZED HEALTH CARE EDUCATION/TRAINING PROGRAM

## 2025-02-14 PROCEDURE — 2500000001 HC RX 250 WO HCPCS SELF ADMINISTERED DRUGS (ALT 637 FOR MEDICARE OP)

## 2025-02-14 PROCEDURE — 36415 COLL VENOUS BLD VENIPUNCTURE: CPT | Performed by: STUDENT IN AN ORGANIZED HEALTH CARE EDUCATION/TRAINING PROGRAM

## 2025-02-14 PROCEDURE — 2500000002 HC RX 250 W HCPCS SELF ADMINISTERED DRUGS (ALT 637 FOR MEDICARE OP, ALT 636 FOR OP/ED): Performed by: STUDENT IN AN ORGANIZED HEALTH CARE EDUCATION/TRAINING PROGRAM

## 2025-02-14 RX ORDER — POLYETHYLENE GLYCOL 3350 17 G/17G
17 POWDER, FOR SOLUTION ORAL DAILY
Qty: 510 G | Refills: 0 | Status: SHIPPED | OUTPATIENT
Start: 2025-02-15

## 2025-02-14 RX ORDER — AMOXICILLIN AND CLAVULANATE POTASSIUM 875; 125 MG/1; MG/1
1 TABLET, FILM COATED ORAL 2 TIMES DAILY
Qty: 14 TABLET | Refills: 0 | Status: SHIPPED | OUTPATIENT
Start: 2025-02-14 | End: 2025-02-21

## 2025-02-14 RX ADMIN — CEFTRIAXONE SODIUM 1 G: 1 INJECTION, SOLUTION INTRAVENOUS at 05:23

## 2025-02-14 RX ADMIN — POLYETHYLENE GLYCOL 3350 17 G: 17 POWDER, FOR SOLUTION ORAL at 08:10

## 2025-02-14 RX ADMIN — PANTOPRAZOLE SODIUM 20 MG: 20 TABLET, DELAYED RELEASE ORAL at 08:10

## 2025-02-14 RX ADMIN — DOCUSATE SODIUM LIQUID 100 MG: 100 LIQUID ORAL at 08:10

## 2025-02-14 RX ADMIN — LACTULOSE 10 G: 20 SOLUTION ORAL at 08:10

## 2025-02-14 ASSESSMENT — PAIN SCALES - GENERAL: PAINLEVEL_OUTOF10: 0 - NO PAIN

## 2025-02-14 NOTE — PROGRESS NOTES
Name: Niharika Pandya  MRN: [id number]  Age: 89 y.o.      Date of Admission:  2/12/2025      Subjective     Patient appear comfortable, lying in bed. More awake and interactive than yesterday. Denies pain or SOB. No dysuria.    Objective     Vitals:    02/14/25 0018 02/14/25 0130 02/14/25 0525 02/14/25 0849   BP: 156/80 162/78 164/81 138/68   BP Location: Right arm Left arm Right arm Right arm   Patient Position: Lying Lying Lying Lying   Pulse: (!) 107 67 (!) 103 84   Resp: 16 10 16 15   Temp:  36.2 °C (97.2 °F) 35.8 °C (96.4 °F)    TempSrc:  Temporal Tympanic    SpO2: 100% 100% 100% 100%   Weight:       Height:          No intake/output data recorded.  No intake/output data recorded.    Wt Readings from Last 3 Encounters:   02/12/25 57.6 kg (127 lb)   07/24/24 57.6 kg (127 lb)   01/27/20 57.6 kg (127 lb)     Body mass index is 22.5 kg/m².    Physical Exam     Gen: Chronically ill, nontoxic   Head/Neck: normocephalic, atraumatic  Eyes: anicteric sclerae, noninjected conjunctivae  Mouth:  MMM  Heart: RRR, no murmurs, rubs, or gallops  Lungs: No increased work of breathing, lungs clear bilaterally, no wheezing, crackles, rhonchi  Abdomen: soft, NT, ND, no HSM, no palpable masses, good bowel sounds  Musculoskeletal: no joint swelling  Extremities: WWP, cap refill <2sec  Neurologic: Alert and oriented to person but not to place, or situation, symmetrical facies, phonates clearly, moves all extremities equally, responsive to touch  Skin: no rashes  Psychological: appropriate mood/affect          Labs    Results from last 7 days   Lab Units 02/14/25  0522 02/13/25  0542 02/13/25  0057   SODIUM mmol/L 139 139 138   POTASSIUM mmol/L 4.0 3.8 3.7   CHLORIDE mmol/L 109* 107 108*   CO2 mmol/L 20* 23 23   BUN mg/dL 11 15 17   CREATININE mg/dL 0.61 0.59 0.68   GLUCOSE mg/dL 66* 103* 128*   CALCIUM mg/dL 8.3* 8.3* 8.2*      Results from last 7 days   Lab Units 02/14/25  0522 02/13/25  0444 02/12/25  2251   WBC AUTO x10*3/uL 6.9 7.1  6.3   HEMOGLOBIN g/dL 11.5* 11.1* 6.7*   HEMATOCRIT % 34.3* 34.9* 20.5*   PLATELETS AUTO x10*3/uL 245 262 167            Microbiology  Susceptibility data from last 90 days.  Collected Specimen Info Organism   02/13/25 Urine from Clean Catch/Voided Enteric bacilli        Radiology  US right upper quadrant   Final Result   1. Increased echogenicity of liver suggestive of fatty infiltration.   2. Cholelithiasis without evident acute cholecystitis.        I personally reviewed the images/study and I agree with the resident   findings as stated by Gabriela Spicer MD. This study was interpreted at   [place], [place].        MACRO:   None        Signed by: Byron Londono 2/13/2025 3:33 PM   Dictation workstation:   [id number]      CT head wo IV contrast   Final Result   CT HEAD:   1. No acute intracranial abnormality.   2. Severe supratentorial chronic ischemic changes and supratentorial   volume loss bifrontal and temporal predominance, slightly progressed.             CT CERVICAL SPINE:   1. No acute fracture or traumatic malalignment of the cervical spine.   2. Multilevel spondylosis of the cervical spine resulting in varying   degrees of spinal canal and neural foraminal stenosis as described   above.             I personally reviewed the images/study and I agree with the findings   as stated by Dr. Gautam Irby. This study was interpreted at [place], [place].        MACRO:   None.        Signed by: Uday Joaquin 2/13/2025 2:53 AM   Dictation workstation:   [id number]      CT cervical spine wo IV contrast   Final Result   CT HEAD:   1. No acute intracranial abnormality.   2. Severe supratentorial chronic ischemic changes and supratentorial   volume loss bifrontal and temporal predominance, slightly progressed.             CT CERVICAL SPINE:   1. No acute fracture or traumatic malalignment of the cervical spine.   2.  Multilevel spondylosis of the cervical spine resulting in varying   degrees of spinal canal and neural foraminal stenosis as described   above.             I personally reviewed the images/study and I agree with the findings   as stated by Dr. Gautam Irby. This study was interpreted at [place], [place].        MACRO:   None.        Signed by: Uday Joaquin 2/13/2025 2:53 AM   Dictation workstation:   [id number]      CT thoracic spine wo IV contrast   Final Result   CT thoracic spine:   1. No acute fracture or malalignment.   2. Mild spondylotic changes without significant canal stenosis.             CT lumbar spine:   1. No acute fracture or malalignment.   2. Mild spondylotic change without significant canal or neural   foraminal stenosis.             I personally reviewed the images/study and I agree with the findings   as stated by Dr. Gautam Irby. This study was interpreted at [place], [place].        MACRO:   None.        Signed by: Uday Joaquin 2/13/2025 3:11 AM   Dictation workstation:   [id number]      CT lumbar spine wo IV contrast   Final Result   CT thoracic spine:   1. No acute fracture or malalignment.   2. Mild spondylotic changes without significant canal stenosis.             CT lumbar spine:   1. No acute fracture or malalignment.   2. Mild spondylotic change without significant canal or neural   foraminal stenosis.             I personally reviewed the images/study and I agree with the findings   as stated by Dr. Gautam Irby. This study was interpreted at [place], [place].        MACRO:   None.        Signed by: Uday Joaquin 2/13/2025 3:11 AM   Dictation workstation:   [id number]      CT abdomen pelvis w IV contrast   Final Result   CTA Chest:   1. No acute pulmonary embolism or other acute cardiopulmonary   process. Specifically, no evidence of  aspiration.             CT Abdomen Pelvis:   1. Large amount of stool in the rectum similar but less pronounced   compared to prior study consistent with chronic fecal impaction.   2. Small bowel containing left inguinal hernia without obstruction or   strangulation, new from prior study.   3. Increased induration of the soft tissues overlying the distal   coccyx, to be correlated with concern for underlying decubitus ulcer.   No osseous destruction or erosive changes.   4. Severe osteopenia.   5. Additional incidental non-acute findings as detailed above.             I personally reviewed the images/study and I agree with the findings   as stated by Dr. Gautam Irby. This study was interpreted at [place], [place].        MACRO:   None.        Signed by: Uday Joaquin 2/13/2025 3:05 AM   Dictation workstation:   [id number]      CT angio chest for pulmonary embolism   Final Result   CTA Chest:   1. No acute pulmonary embolism or other acute cardiopulmonary   process. Specifically, no evidence of aspiration.             CT Abdomen Pelvis:   1. Large amount of stool in the rectum similar but less pronounced   compared to prior study consistent with chronic fecal impaction.   2. Small bowel containing left inguinal hernia without obstruction or   strangulation, new from prior study.   3. Increased induration of the soft tissues overlying the distal   coccyx, to be correlated with concern for underlying decubitus ulcer.   No osseous destruction or erosive changes.   4. Severe osteopenia.   5. Additional incidental non-acute findings as detailed above.             I personally reviewed the images/study and I agree with the findings   as stated by Dr. Gautam Irby. This study was interpreted at [place], [place].        MACRO:   None.        Signed by: Uday Joaquin 2/13/2025 3:05 AM   Dictation workstation:   [id number]       XR chest 2 views   Final Result   1. No acute cardiopulmonary process.             I personally reviewed the images/study and I agree with the findings   as stated by Dr. Gautam Irby. This study was interpreted at [facility], [place], [place].        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 Differential diagnoses for this is UTI vs transient hypotension ( per EMS report, however normotensive iv ED). Also noted to have worsening transaminitis with hepatocellular pattern of injury . Currently requiring inpatient admission for IV antibiotics for UTI and further workup for hepatic injury with assistance of hepatology.      #Transaminitis   Patient was noted to have elevated liver enzymes to  and  alk phos 175, previously 564/403 in February. LFTS slightly improved on 2/14 to 458 and 261.  Pattern of liver injury is hepatocellular. No clear drug triggers. Potentially from hypotension at home if was 70/30.  RUQ ultrasound showing increased echogenicity of the liver suggestive of fatty infiltration as well as cholelithiasis.  Plan   - Trend LFTs  - Appreciated hepatology recommendations     #UTI   Patient noted to have +WBCs and leukesterase on urine sample. She has previously grown ampicillin resistant E Coli. + Enteric bacilli for this culture. Unclear if  this is complicated infection or not. Per qSOFA did meet 2/3 criteria with hypotension and encephalopathy when EMS arrived. Although normal on arrival to ED. No SIRS criteria met. Would treat as simple UTI however because no anatomic abnormalities, catheters or devices, > 3 UTIs in 12 months, healthcare associated, and not male gender.  Plan   - Continue ceftriaxone 1g q24 hours   - Follow up culture - prelim enteric bacilli    #End stage dementia  Patient appears to be at her baseline at this time (AAOx0- AAOx1). AMS previously likely related to UTI. Nonfocal exam.   - Consider geriatrics if change in status   - Delirium precautions     #DVT/PE   :: inital HgB 11.7, repeat was 6.7 with no observed bleeding --> 11.1 ( likely error)  -Continue home xarelto 20mg daily     #GERD   - Continue home pantoprazole     CODE status: Full code (confirmed with lalo)  Surrogate decision maker: Davide Ku I spent 45 minutes in the care of this patient including calling family, chart review, examining patient, and appropriate documentation and clinical results.

## 2025-02-14 NOTE — HOSPITAL COURSE
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 Lab work also demonstrated elevated LFTs to 403/564. Other workup and imaging reassuring including CT abdomen/pelvis (chronic constipation), CT PE negative, CT head and and spine negative for acute findings. RUQ showed fatty infiltration and cholelithiasis without signs of cholecystitis.     Hepatology consulted regarding liver injury. Most likely etiology thought to be ischemic due to hypotension, although atypical that kidneys would not be affected in similar way. Recommended trending OP to make sure of resolution. Further workup inpatient included negative MELODY, acute hepatitis panel, acetaminophen,salicylates, alcohol, tox panel.     Regarding UTI, patient was treated with ceftriaxone inpatient and transitioned to Augmentin ( given past susceptibility patterns).     Patient has been bed bound for several years. Patient has home care set up previously. Refused further PT including SNF so PT/OT evaluation not pursued. Discharged home on 2/14.

## 2025-02-14 NOTE — SIGNIFICANT EVENT
Ms. Pandya is a 89-year-old female with a past medical history of dementia (baseline A&O 0-1), deep venous thrombosis, pulmonary embolism, and pulmonary arterial hypertension who presented to [facility] on 2/12 after an episode of decreased responsiveness and gasping.      Ms. Pandya's liver enzymes today trended down, with her AST dropping from a peak of 844 yesterday to 261 today.  Her ALT peaked at 710 yesterday and was 458 today.  Ms. Zengs alkaline phosphatase remains mildly elevated at 187 today up slightly from 175 yesterday.  This down-trending of Ms. Pandya's AST and ALT would fit with the hypothesis that she suffered an episode of ischemic hepatitis secondary to her episode of hypotension at home on 2/12.  Notably, Ms. Pandya's creatinine remains non-elevated at 0.61, a value generally not indicative of an acute kidney injury that would accompany ischemic hepatitis.  However, Ms. Pandya does have relatively low muscle mass, so it is possible that her creatinine is under-estimating her true renal function.     While Ms. Pandya's blood cultures continue to have no growth to date, her urine culture is notably for enteric bacilli.  It is considered possible that Ms. Pandya has another episode of an e. coli urinary tract infection, with her being diagnosed with this infection in July.     Plan:  -Agree with plan to discharge patient today  -Patient should have repeat hepatic function labs in a week, with PCP following this.  -No need for GI/hepatology outpatient follow-up at this time.    Thank you for the consultation.  The consulting team will sign off now.  Please do not hesitate to contact us again on by paging the consultation team again between the weekday hours of 7 AM - 5 PM.  If there is an urgent concern during the weekend, after-hours, or holidays; then please page the [place]-call GI fellow at [phone]. Thank you.

## 2025-02-14 NOTE — PROGRESS NOTES
"Morteza Beltran MD  [facility] ([facility] ) [address] [id number] [phone]                       " Niharika Pandya is a 89 y.o. female on day 1 of admission presenting with Episode of unresponsiveness.    Assessment/Plan   Spoke with Pt's daughter by phone. She explained she resides with Pt. No current concerns regarding SDOH. Daughter to arrive bedside via family member arranging Uber. MD asked for late discharge transport to be pre-arranged. SW therefore did stretcher transport with family permission. Transport to come at 5:30pm.   Spoke with Pt's daughter bedside. No needs at this time. Pt is [facility]. Spoke with Susan at [facility] [phone] by phone and faxed discharge paperwork to [facility] [phone].  No further needs. Spoke with daughter briefly bedside and she said her relative would take her home while Pt waits for transport.     JUICE Bland

## 2025-02-14 NOTE — DISCHARGE SUMMARY
Discharge Diagnosis  Episode of unresponsiveness    Issues Requiring Follow-Up  Transaminitis: Potential ischemic liver injury, RUQ with fatty infiltration. Liver stamp negative ( as listed in summary below). Trend OP    UTI: Will follow cultures, previously ampicillin resistant E coli. Augmentin for home for 7 total days.    Discharge Meds     Medication List      START taking these medications     amoxicillin-pot clavulanate 875-125 mg tablet; Commonly known as:   Augmentin; Take 1 tablet by mouth 2 times a day for 7 days.   polyethylene glycol 17 gram/dose powder; Commonly known as: Glycolax,   Miralax; Mix 17 g of powder and drink once daily. 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 Lab work also demonstrated elevated LFTs to 403/564. Other workup and imaging reassuring including CT abdomen/pelvis (chronic constipation), CT PE negative, CT head and and spine negative for acute findings. RUQ showed fatty infiltration and cholelithiasis without signs of cholecystitis.     Hepatology consulted regarding liver injury. Most  likely etiology thought to be ischemic due to hypotension, although atypical that kidneys would not be affected in similar way. Recommended trending OP to make sure of resolution. Further workup inpatient included negative MELODY, acute hepatitis panel, acetaminophen,salicylates, alcohol, tox panel.     Regarding UTI, patient was treated with ceftriaxone inpatient and transitioned to Augmentin ( given past susceptibility patterns).     Patient has been bed bound for several years. Patient has home care set up previously. Refused further PT including SNF so PT/OT evaluation not pursued. Discharged home on 2/14.         Pertinent Physical Exam At Time of Discharge  Physical Exam  Gen: Chronically ill, nontoxic   Head/Neck: normocephalic, atraumatic  Eyes: anicteric sclerae, noninjected conjunctivae  Mouth:  MMM  Heart: RRR, no murmurs, rubs, or gallops  Lungs: No increased work of breathing, lungs clear bilaterally, no wheezing, crackles, rhonchi  Abdomen: soft, NT, ND, no HSM, no palpable masses, good bowel sounds  Musculoskeletal: no joint swelling  Extremities: WWP, cap refill <2sec  Neurologic: Alert and oriented to person but not to place, or situation, symmetrical facies, phonates clearly, moves all extremities equally, responsive to touch  Skin: no rashes  Psychological: appropriate mood/affect          Outpatient Follow-Up  No future appointments.      Justine Tsai MD

## 2025-02-15 LAB — BACTERIA UR CULT: ABNORMAL

## 2025-02-17 LAB
BACTERIA BLD CULT: NORMAL
BACTERIA BLD CULT: NORMAL

## 2025-02-20 NOTE — DOCUMENTATION CLARIFICATION NOTE
"    PATIENT:               ANANT UMAÑA  ACCT #:                  [id number]  MRN:                       [id number]  :                       1934  ADMIT DATE:       2025 9:05 PM  DISCH DATE:        2025 9:14 PM  RESPONDING PROVIDER #:        [id number]          PROVIDER RESPONSE TEXT:    Complete immobility due to frailty and severe dementia    CDI QUERY TEXT:    Clarification    Instruction:    Based on your assessment of the patient and the clinical information, please provide the requested documentation by clicking on the appropriate radio button and enter any additional information if prompted.    Question: Is there a diagnosis indicative of the patients documented debility?    When answering this query, please exercise your independent professional judgment. The fact that a question is being asked, does not imply that any particular answer is desired or expected.    The patient's clinical indicators include:  Clinical Information: 89-year-old female with dementia admitted for AMS, found unresponsive; who has been bedbound for 8 years.    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 and also on anticoagulation with Xarelto, advanced dementia with baseline AAO x 0-1 ( bed bound for the past 8 years), who presented for AMS.\"  -R/LUE contracted w/ limited movement per nursing assessment  -R/LLE weak w/ limited movement    Treatment: family refused further PT including SNF--lives w/ daughter    Risk Factors:  advanced dementia, bed bound, hx of PE  Options provided:  -- Complete immobility due to frailty and severe dementia  -- Functional quadriplegia  -- Other - I will add my own diagnosis  -- Refer to Clinical Documentation Reviewer    Query created by: Octavia Vásquez on 2025 10:24 AM      Electronically signed by:  JOSIAS CASTRO " FELA DORANTES 2/20/2025 2:55 PM

## 2025-02-20 NOTE — DOCUMENTATION CLARIFICATION NOTE
"    PATIENT:               ANANT UMAÑA  ACCT #:                  [id number]  MRN:                       [id number]  :                       1934  ADMIT DATE:       2025 9:05 PM  DISCH DATE:        2025 9:14 PM  RESPONDING PROVIDER #:        [id number]          PROVIDER RESPONSE TEXT:    Multifactorial Encephalopathy, metabolic encephalopathy and hepatic encephalopathy 2/2 UTI and ischemic hepatitis superimposed on end stage dementia    CDI QUERY TEXT:    Clarification    Instruction:    Based on your assessment of the patient and the clinical information, please provide the requested documentation by clicking on the appropriate radio button and enter any additional information if prompted.    Question: Please further clarify the type of Encephalopathy as    When answering this query, please exercise your independent professional judgment. The fact that a question is being asked, does not imply that any particular answer is desired or expected.    The patient's clinical indicators include:  Clinical Information: 89 year old female with end stage dementia who presented w/ AMS.    Clinical Indicators:  -RUQ ultrasound showing increased echogenicity of the liver suggestive of fatty infiltration as well as cholelithiasis.  -Urine culture: + Enteric bacilli    -H&P noted \"family found the patient unresponsive at home; at baseline she is oriented to self only and this episode was worse than her normal. She was also noted to have difficulty breathing.\"    -PN  noted \"Patient appears to be at her baseline at this time (AAOx0- AAOx1). AMS previously likely related to UTI. More awake and interactive than yesterday.\"    Treatment: Trend LFTs, ceftriaxone 1g q24 hours, 1 L bolus LR, Augmentin for home for 7 total days    Risk Factors: dementia, UTI, transaminitis, ischemic hepatitis, bed bound  Options provided:  -- Metabolic Encephalopathy 2/2 UTI superimposed on end stage dementia  -- Multifactorial " Encephalopathy, metabolic encephalopathy and hepatic encephalopathy 2/2 UTI and ischemic hepatitis superimposed on end stage dementia  -- Other - I will add my own diagnosis  -- Refer to Clinical Documentation Reviewer    Query created by: Octavia Vásquez on 2/18/2025 9:57 AM      Electronically signed by:  JOSIAS CHAHAL MD 2/20/2025 2:55 PM

## 2025-06-23 ENCOUNTER — CLINICAL SUPPORT (OUTPATIENT)
Dept: EMERGENCY MEDICINE | Facility: HOSPITAL | Age: OVER 89
DRG: 690 | End: 2025-06-23
Payer: MEDICARE

## 2025-06-23 ENCOUNTER — APPOINTMENT (OUTPATIENT)
Dept: RADIOLOGY | Facility: HOSPITAL | Age: OVER 89
DRG: 690 | End: 2025-06-23
Payer: MEDICARE

## 2025-06-23 ENCOUNTER — HOSPITAL ENCOUNTER (INPATIENT)
Facility: HOSPITAL | Age: OVER 89
LOS: 2 days | Discharge: HOME | DRG: 690 | End: 2025-06-26
Attending: EMERGENCY MEDICINE | Admitting: INTERNAL MEDICINE
Payer: MEDICARE

## 2025-06-23 DIAGNOSIS — N39.0 UTI (URINARY TRACT INFECTION), BACTERIAL: ICD-10-CM

## 2025-06-23 DIAGNOSIS — A49.9 UTI (URINARY TRACT INFECTION), BACTERIAL: ICD-10-CM

## 2025-06-23 DIAGNOSIS — A49.8 INFECTION DUE TO ENTEROBACTER CLOACAE: Primary | ICD-10-CM

## 2025-06-23 LAB
ALBUMIN SERPL BCP-MCNC: 2.8 G/DL (ref 3.4–5)
ALP SERPL-CCNC: 71 U/L (ref 33–136)
ALT SERPL W P-5'-P-CCNC: 22 U/L (ref 7–45)
ANION GAP SERPL CALC-SCNC: 14 MMOL/L (ref 10–20)
AST SERPL W P-5'-P-CCNC: 25 U/L (ref 9–39)
BASOPHILS # BLD MANUAL: 0 X10*3/UL (ref 0–0.1)
BASOPHILS NFR BLD MANUAL: 0 %
BILIRUB SERPL-MCNC: 0.3 MG/DL (ref 0–1.2)
BLASTS # BLD MANUAL: 0 X10*3/UL
BLASTS NFR BLD MANUAL: 0 %
BUN SERPL-MCNC: 15 MG/DL (ref 6–23)
CALCIUM SERPL-MCNC: 7.3 MG/DL (ref 8.6–10.6)
CARDIAC TROPONIN I PNL SERPL HS: <3 NG/L (ref 0–34)
CHLORIDE SERPL-SCNC: 107 MMOL/L (ref 98–107)
CO2 SERPL-SCNC: 19 MMOL/L (ref 21–32)
CREAT SERPL-MCNC: 0.78 MG/DL (ref 0.5–1.05)
EGFRCR SERPLBLD CKD-EPI 2021: 72 ML/MIN/1.73M*2
EOSINOPHIL # BLD MANUAL: 0 X10*3/UL (ref 0–0.4)
EOSINOPHIL NFR BLD MANUAL: 0 %
ERYTHROCYTE [DISTWIDTH] IN BLOOD BY AUTOMATED COUNT: 15.7 % (ref 11.5–14.5)
GLUCOSE BLD MANUAL STRIP-MCNC: 108 MG/DL (ref 74–99)
GLUCOSE SERPL-MCNC: 106 MG/DL (ref 74–99)
HCT VFR BLD AUTO: 31.4 % (ref 36–46)
HGB BLD-MCNC: 10 G/DL (ref 12–16)
IMM GRANULOCYTES # BLD AUTO: 0.05 X10*3/UL (ref 0–0.5)
IMM GRANULOCYTES NFR BLD AUTO: 0.5 % (ref 0–0.9)
LYMPHOCYTES # BLD MANUAL: 2.79 X10*3/UL (ref 0.8–3)
LYMPHOCYTES NFR BLD MANUAL: 25.4 %
MAGNESIUM SERPL-MCNC: 2.11 MG/DL (ref 1.6–2.4)
MCH RBC QN AUTO: 23.4 PG (ref 26–34)
MCHC RBC AUTO-ENTMCNC: 31.8 G/DL (ref 32–36)
MCV RBC AUTO: 73 FL (ref 80–100)
METAMYELOCYTES # BLD MANUAL: 0 X10*3/UL
METAMYELOCYTES NFR BLD MANUAL: 0 %
MONOCYTES # BLD MANUAL: 0.09 X10*3/UL (ref 0.05–0.8)
MONOCYTES NFR BLD MANUAL: 0.8 %
MYELOCYTES # BLD MANUAL: 0 X10*3/UL
MYELOCYTES NFR BLD MANUAL: 0 %
NEUTROPHILS # BLD MANUAL: 8.12 X10*3/UL (ref 1.6–5.5)
NEUTS BAND # BLD MANUAL: 1.31 X10*3/UL (ref 0–0.5)
NEUTS BAND NFR BLD MANUAL: 11.9 %
NEUTS SEG # BLD MANUAL: 6.81 X10*3/UL (ref 1.6–5)
NEUTS SEG NFR BLD MANUAL: 61.9 %
NRBC BLD MANUAL-RTO: 0 % (ref 0–0)
NRBC BLD-RTO: 0 /100 WBCS (ref 0–0)
OVALOCYTES BLD QL SMEAR: ABNORMAL
PLASMA CELLS # BLD MANUAL: 0 X10*3/UL
PLASMA CELLS NFR BLD MANUAL: 0 %
PLATELET # BLD AUTO: 252 X10*3/UL (ref 150–450)
POTASSIUM SERPL-SCNC: 3.9 MMOL/L (ref 3.5–5.3)
PROMYELOCYTES # BLD MANUAL: 0 X10*3/UL
PROMYELOCYTES NFR BLD MANUAL: 0 %
PROT SERPL-MCNC: 5.3 G/DL (ref 6.4–8.2)
RBC # BLD AUTO: 4.28 X10*6/UL (ref 4–5.2)
RBC MORPH BLD: ABNORMAL
SODIUM SERPL-SCNC: 136 MMOL/L (ref 136–145)
TARGETS BLD QL SMEAR: ABNORMAL
TOTAL CELLS COUNTED BLD: 118
VARIANT LYMPHS # BLD MANUAL: 0 X10*3/UL (ref 0–0.3)
VARIANT LYMPHS NFR BLD: 0 %
WBC # BLD AUTO: 11 X10*3/UL (ref 4.4–11.3)

## 2025-06-23 PROCEDURE — 2500000004 HC RX 250 GENERAL PHARMACY W/ HCPCS (ALT 636 FOR OP/ED)

## 2025-06-23 PROCEDURE — 36415 COLL VENOUS BLD VENIPUNCTURE: CPT

## 2025-06-23 PROCEDURE — 80053 COMPREHEN METABOLIC PANEL: CPT

## 2025-06-23 PROCEDURE — 85027 COMPLETE CBC AUTOMATED: CPT

## 2025-06-23 PROCEDURE — 74177 CT ABD & PELVIS W/CONTRAST: CPT

## 2025-06-23 PROCEDURE — 81001 URINALYSIS AUTO W/SCOPE: CPT

## 2025-06-23 PROCEDURE — 99285 EMERGENCY DEPT VISIT HI MDM: CPT | Mod: 25 | Performed by: EMERGENCY MEDICINE

## 2025-06-23 PROCEDURE — 84484 ASSAY OF TROPONIN QUANT: CPT

## 2025-06-23 PROCEDURE — 70450 CT HEAD/BRAIN W/O DYE: CPT

## 2025-06-23 PROCEDURE — 82947 ASSAY GLUCOSE BLOOD QUANT: CPT

## 2025-06-23 PROCEDURE — 87506 IADNA-DNA/RNA PROBE TQ 6-11: CPT

## 2025-06-23 PROCEDURE — 87086 URINE CULTURE/COLONY COUNT: CPT

## 2025-06-23 PROCEDURE — 85007 BL SMEAR W/DIFF WBC COUNT: CPT

## 2025-06-23 PROCEDURE — 96360 HYDRATION IV INFUSION INIT: CPT

## 2025-06-23 PROCEDURE — 93005 ELECTROCARDIOGRAM TRACING: CPT

## 2025-06-23 PROCEDURE — 71046 X-RAY EXAM CHEST 2 VIEWS: CPT

## 2025-06-23 PROCEDURE — 96361 HYDRATE IV INFUSION ADD-ON: CPT

## 2025-06-23 PROCEDURE — 83735 ASSAY OF MAGNESIUM: CPT

## 2025-06-23 PROCEDURE — 84132 ASSAY OF SERUM POTASSIUM: CPT

## 2025-06-23 PROCEDURE — 82375 ASSAY CARBOXYHB QUANT: CPT

## 2025-06-23 RX ORDER — ONDANSETRON HYDROCHLORIDE 2 MG/ML
4 INJECTION, SOLUTION INTRAVENOUS ONCE
Status: COMPLETED | OUTPATIENT
Start: 2025-06-23 | End: 2025-06-24

## 2025-06-23 RX ADMIN — SODIUM CHLORIDE, SODIUM LACTATE, POTASSIUM CHLORIDE, AND CALCIUM CHLORIDE 500 ML: .6; .31; .03; .02 INJECTION, SOLUTION INTRAVENOUS at 21:03

## 2025-06-24 PROBLEM — N39.0 UTI (URINARY TRACT INFECTION), BACTERIAL: Status: ACTIVE | Noted: 2025-06-24

## 2025-06-24 PROBLEM — A49.9 UTI (URINARY TRACT INFECTION), BACTERIAL: Status: ACTIVE | Noted: 2025-06-24

## 2025-06-24 LAB
ALBUMIN SERPL BCP-MCNC: 3.1 G/DL (ref 3.4–5)
ANION GAP BLDV CALCULATED.4IONS-SCNC: 12 MMOL/L (ref 10–25)
ANION GAP SERPL CALC-SCNC: 15 MMOL/L (ref 10–20)
APPEARANCE UR: ABNORMAL
BACTERIA #/AREA URNS AUTO: ABNORMAL /HPF
BASE EXCESS BLDV CALC-SCNC: -3.5 MMOL/L (ref -2–3)
BILIRUB UR STRIP.AUTO-MCNC: NEGATIVE MG/DL
BODY TEMPERATURE: ABNORMAL
BUN SERPL-MCNC: 16 MG/DL (ref 6–23)
C COLI+JEJ+UPSA DNA STL QL NAA+PROBE: NOT DETECTED
CA-I BLDV-SCNC: 1.05 MMOL/L (ref 1.1–1.33)
CALCIUM SERPL-MCNC: 8.4 MG/DL (ref 8.6–10.6)
CARDIAC TROPONIN I PNL SERPL HS: 9 NG/L (ref 0–34)
CHLORIDE BLDV-SCNC: 105 MMOL/L (ref 98–107)
CHLORIDE SERPL-SCNC: 105 MMOL/L (ref 98–107)
CO2 SERPL-SCNC: 21 MMOL/L (ref 21–32)
COLOR UR: ABNORMAL
CREAT SERPL-MCNC: 0.76 MG/DL (ref 0.5–1.05)
EC STX1 GENE STL QL NAA+PROBE: NOT DETECTED
EC STX2 GENE STL QL NAA+PROBE: NOT DETECTED
EGFRCR SERPLBLD CKD-EPI 2021: 74 ML/MIN/1.73M*2
ERYTHROCYTE [DISTWIDTH] IN BLOOD BY AUTOMATED COUNT: 15.1 % (ref 11.5–14.5)
GLUCOSE BLD MANUAL STRIP-MCNC: 96 MG/DL (ref 74–99)
GLUCOSE BLDV-MCNC: 100 MG/DL (ref 74–99)
GLUCOSE SERPL-MCNC: 107 MG/DL (ref 74–99)
GLUCOSE UR STRIP.AUTO-MCNC: NORMAL MG/DL
HCO3 BLDV-SCNC: 22.7 MMOL/L (ref 22–26)
HCT VFR BLD AUTO: 34.3 % (ref 36–46)
HCT VFR BLD EST: 32 % (ref 36–46)
HGB BLD-MCNC: 11.1 G/DL (ref 12–16)
HGB BLDV-MCNC: 10.5 G/DL (ref 12–16)
INHALED O2 CONCENTRATION: 21 %
KETONES UR STRIP.AUTO-MCNC: NEGATIVE MG/DL
LACTATE BLDV-SCNC: 2.8 MMOL/L (ref 0.4–2)
LACTATE BLDV-SCNC: 2.8 MMOL/L (ref 0.4–2)
LEUKOCYTE ESTERASE UR QL STRIP.AUTO: ABNORMAL
MCH RBC QN AUTO: 23.3 PG (ref 26–34)
MCHC RBC AUTO-ENTMCNC: 32.4 G/DL (ref 32–36)
MCV RBC AUTO: 72 FL (ref 80–100)
NITRITE UR QL STRIP.AUTO: NEGATIVE
NOROVIRUS GI + GII RNA STL NAA+PROBE: NOT DETECTED
NRBC BLD-RTO: 0 /100 WBCS (ref 0–0)
OXYHGB MFR BLDV: 17.6 % (ref 45–75)
PCO2 BLDV: 45 MM HG (ref 41–51)
PH BLDV: 7.31 PH (ref 7.33–7.43)
PH UR STRIP.AUTO: 6 [PH]
PHOSPHATE SERPL-MCNC: 3.2 MG/DL (ref 2.5–4.9)
PLATELET # BLD AUTO: 304 X10*3/UL (ref 150–450)
PO2 BLDV: 20 MM HG (ref 35–45)
POTASSIUM BLDV-SCNC: 3.8 MMOL/L (ref 3.5–5.3)
POTASSIUM SERPL-SCNC: 4.1 MMOL/L (ref 3.5–5.3)
PROT UR STRIP.AUTO-MCNC: NEGATIVE MG/DL
RBC # BLD AUTO: 4.77 X10*6/UL (ref 4–5.2)
RBC # UR STRIP.AUTO: ABNORMAL MG/DL
RBC #/AREA URNS AUTO: ABNORMAL /HPF
RV RNA STL NAA+PROBE: NOT DETECTED
SALMONELLA DNA STL QL NAA+PROBE: NOT DETECTED
SAO2 % BLDV: 18 % (ref 45–75)
SHIGELLA DNA SPEC QL NAA+PROBE: NOT DETECTED
SODIUM BLDV-SCNC: 136 MMOL/L (ref 136–145)
SODIUM SERPL-SCNC: 137 MMOL/L (ref 136–145)
SP GR UR STRIP.AUTO: 1.01
UROBILINOGEN UR STRIP.AUTO-MCNC: NORMAL MG/DL
V CHOLERAE DNA STL QL NAA+PROBE: NOT DETECTED
WBC # BLD AUTO: 26.8 X10*3/UL (ref 4.4–11.3)
WBC #/AREA URNS AUTO: >50 /HPF
WBC CLUMPS #/AREA URNS AUTO: ABNORMAL /HPF
Y ENTEROCOL DNA STL QL NAA+PROBE: NOT DETECTED

## 2025-06-24 PROCEDURE — 83605 ASSAY OF LACTIC ACID: CPT

## 2025-06-24 PROCEDURE — 2500000002 HC RX 250 W HCPCS SELF ADMINISTERED DRUGS (ALT 637 FOR MEDICARE OP, ALT 636 FOR OP/ED): Performed by: INTERNAL MEDICINE

## 2025-06-24 PROCEDURE — 70450 CT HEAD/BRAIN W/O DYE: CPT | Performed by: STUDENT IN AN ORGANIZED HEALTH CARE EDUCATION/TRAINING PROGRAM

## 2025-06-24 PROCEDURE — 84100 ASSAY OF PHOSPHORUS: CPT | Performed by: INTERNAL MEDICINE

## 2025-06-24 PROCEDURE — 71046 X-RAY EXAM CHEST 2 VIEWS: CPT | Performed by: STUDENT IN AN ORGANIZED HEALTH CARE EDUCATION/TRAINING PROGRAM

## 2025-06-24 PROCEDURE — 84484 ASSAY OF TROPONIN QUANT: CPT

## 2025-06-24 PROCEDURE — 99222 1ST HOSP IP/OBS MODERATE 55: CPT | Performed by: INTERNAL MEDICINE

## 2025-06-24 PROCEDURE — 85027 COMPLETE CBC AUTOMATED: CPT | Performed by: INTERNAL MEDICINE

## 2025-06-24 PROCEDURE — 74177 CT ABD & PELVIS W/CONTRAST: CPT | Performed by: STUDENT IN AN ORGANIZED HEALTH CARE EDUCATION/TRAINING PROGRAM

## 2025-06-24 PROCEDURE — 2500000004 HC RX 250 GENERAL PHARMACY W/ HCPCS (ALT 636 FOR OP/ED): Performed by: STUDENT IN AN ORGANIZED HEALTH CARE EDUCATION/TRAINING PROGRAM

## 2025-06-24 PROCEDURE — 36415 COLL VENOUS BLD VENIPUNCTURE: CPT

## 2025-06-24 PROCEDURE — 2500000004 HC RX 250 GENERAL PHARMACY W/ HCPCS (ALT 636 FOR OP/ED): Performed by: INTERNAL MEDICINE

## 2025-06-24 PROCEDURE — 82947 ASSAY GLUCOSE BLOOD QUANT: CPT

## 2025-06-24 PROCEDURE — 2550000001 HC RX 255 CONTRASTS: Performed by: STUDENT IN AN ORGANIZED HEALTH CARE EDUCATION/TRAINING PROGRAM

## 2025-06-24 PROCEDURE — 1100000001 HC PRIVATE ROOM DAILY

## 2025-06-24 RX ORDER — ONDANSETRON 4 MG/1
8 TABLET, FILM COATED ORAL EVERY 8 HOURS PRN
COMMUNITY

## 2025-06-24 RX ORDER — ESOMEPRAZOLE MAGNESIUM 40 MG/1
40 GRANULE, DELAYED RELEASE ORAL
Status: DISCONTINUED | OUTPATIENT
Start: 2025-06-25 | End: 2025-06-24

## 2025-06-24 RX ORDER — ESOMEPRAZOLE MAGNESIUM 40 MG/1
40 GRANULE, DELAYED RELEASE ORAL
Status: DISCONTINUED | OUTPATIENT
Start: 2025-06-25 | End: 2025-06-26 | Stop reason: HOSPADM

## 2025-06-24 RX ORDER — CEFTRIAXONE 1 G/50ML
1 INJECTION, SOLUTION INTRAVENOUS EVERY 24 HOURS
Status: DISCONTINUED | OUTPATIENT
Start: 2025-06-25 | End: 2025-06-25

## 2025-06-24 RX ORDER — SODIUM CHLORIDE, SODIUM LACTATE, POTASSIUM CHLORIDE, CALCIUM CHLORIDE 600; 310; 30; 20 MG/100ML; MG/100ML; MG/100ML; MG/100ML
75 INJECTION, SOLUTION INTRAVENOUS CONTINUOUS
Status: DISCONTINUED | OUTPATIENT
Start: 2025-06-24 | End: 2025-06-26 | Stop reason: HOSPADM

## 2025-06-24 RX ORDER — POLYETHYLENE GLYCOL 3350 17 G/17G
17 POWDER, FOR SOLUTION ORAL DAILY PRN
Status: DISCONTINUED | OUTPATIENT
Start: 2025-06-24 | End: 2025-06-26 | Stop reason: HOSPADM

## 2025-06-24 RX ORDER — CEFTRIAXONE 1 G/50ML
1 INJECTION, SOLUTION INTRAVENOUS ONCE
Status: COMPLETED | OUTPATIENT
Start: 2025-06-24 | End: 2025-06-24

## 2025-06-24 RX ORDER — ESOMEPRAZOLE MAGNESIUM 20 MG/1
20 GRANULE, DELAYED RELEASE ORAL
Status: DISCONTINUED | OUTPATIENT
Start: 2025-06-25 | End: 2025-06-24

## 2025-06-24 RX ORDER — PANTOPRAZOLE SODIUM 20 MG/1
20 TABLET, DELAYED RELEASE ORAL
Status: DISCONTINUED | OUTPATIENT
Start: 2025-06-24 | End: 2025-06-24

## 2025-06-24 RX ADMIN — CEFTRIAXONE SODIUM 1 G: 1 INJECTION, SOLUTION INTRAVENOUS at 03:13

## 2025-06-24 RX ADMIN — SODIUM CHLORIDE, SODIUM LACTATE, POTASSIUM CHLORIDE, AND CALCIUM CHLORIDE 75 ML/HR: .6; .31; .03; .02 INJECTION, SOLUTION INTRAVENOUS at 18:35

## 2025-06-24 RX ADMIN — ONDANSETRON 4 MG: 2 INJECTION INTRAMUSCULAR; INTRAVENOUS at 03:13

## 2025-06-24 RX ADMIN — IOHEXOL 75 ML: 350 INJECTION, SOLUTION INTRAVENOUS at 00:50

## 2025-06-24 RX ADMIN — RIVAROXABAN 20 MG: 20 TABLET, FILM COATED ORAL at 17:39

## 2025-06-24 RX ADMIN — SODIUM CHLORIDE, SODIUM LACTATE, POTASSIUM CHLORIDE, AND CALCIUM CHLORIDE 1000 ML: .6; .31; .03; .02 INJECTION, SOLUTION INTRAVENOUS at 03:47

## 2025-06-24 SDOH — SOCIAL STABILITY: SOCIAL INSECURITY: DO YOU FEEL UNSAFE GOING BACK TO THE PLACE WHERE YOU ARE LIVING?: UNABLE TO ASSESS

## 2025-06-24 SDOH — SOCIAL STABILITY: SOCIAL NETWORK: IN A TYPICAL WEEK, HOW MANY TIMES DO YOU TALK ON THE PHONE WITH FAMILY, FRIENDS, OR NEIGHBORS?: PATIENT UNABLE TO ANSWER

## 2025-06-24 SDOH — ECONOMIC STABILITY: FOOD INSECURITY
HOW HARD IS IT FOR YOU TO PAY FOR THE VERY BASICS LIKE FOOD, HOUSING, MEDICAL CARE, AND HEATING?: PATIENT UNABLE TO ANSWER

## 2025-06-24 SDOH — SOCIAL STABILITY: SOCIAL INSECURITY
WITHIN THE LAST YEAR, HAVE YOU BEEN RAPED OR FORCED TO HAVE ANY KIND OF SEXUAL ACTIVITY BY YOUR PARTNER OR EX-PARTNER?: PATIENT UNABLE TO ANSWER

## 2025-06-24 SDOH — ECONOMIC STABILITY: FOOD INSECURITY
WITHIN THE PAST 12 MONTHS, YOU WORRIED THAT YOUR FOOD WOULD RUN OUT BEFORE YOU GOT THE MONEY TO BUY MORE.: PATIENT UNABLE TO ANSWER

## 2025-06-24 SDOH — SOCIAL STABILITY: SOCIAL NETWORK: HOW OFTEN DO YOU ATTEND MEETINGS OF THE CLUBS OR ORGANIZATIONS YOU BELONG TO?: PATIENT UNABLE TO ANSWER

## 2025-06-24 SDOH — SOCIAL STABILITY: SOCIAL NETWORK: HOW OFTEN DO YOU ATTEND CHURCH OR RELIGIOUS SERVICES?: PATIENT UNABLE TO ANSWER

## 2025-06-24 SDOH — ECONOMIC STABILITY: HOUSING INSECURITY: IN THE PAST 12 MONTHS, HOW MANY TIMES HAVE YOU MOVED WHERE YOU WERE LIVING?: 0

## 2025-06-24 SDOH — SOCIAL STABILITY: SOCIAL INSECURITY: HAVE YOU HAD ANY THOUGHTS OF HARMING ANYONE ELSE?: UNABLE TO ASSESS

## 2025-06-24 SDOH — HEALTH STABILITY: PHYSICAL HEALTH: ON AVERAGE, HOW MANY MINUTES DO YOU ENGAGE IN EXERCISE AT THIS LEVEL?: PATIENT UNABLE TO ANSWER

## 2025-06-24 SDOH — SOCIAL STABILITY: SOCIAL INSECURITY: DO YOU FEEL ANYONE HAS EXPLOITED OR TAKEN ADVANTAGE OF YOU FINANCIALLY OR OF YOUR PERSONAL PROPERTY?: UNABLE TO ASSESS

## 2025-06-24 SDOH — ECONOMIC STABILITY: INCOME INSECURITY
IN THE PAST 12 MONTHS HAS THE ELECTRIC, GAS, OIL, OR WATER COMPANY THREATENED TO SHUT OFF SERVICES IN YOUR HOME?: PATIENT UNABLE TO ANSWER

## 2025-06-24 SDOH — SOCIAL STABILITY: SOCIAL INSECURITY: HAS ANYONE EVER THREATENED TO HURT YOUR FAMILY OR YOUR PETS?: UNABLE TO ASSESS

## 2025-06-24 SDOH — ECONOMIC STABILITY: FOOD INSECURITY
WITHIN THE PAST 12 MONTHS, THE FOOD YOU BOUGHT JUST DIDN'T LAST AND YOU DIDN'T HAVE MONEY TO GET MORE.: PATIENT UNABLE TO ANSWER

## 2025-06-24 SDOH — SOCIAL STABILITY: SOCIAL INSECURITY: WITHIN THE LAST YEAR, HAVE YOU BEEN AFRAID OF YOUR PARTNER OR EX-PARTNER?: PATIENT UNABLE TO ANSWER

## 2025-06-24 SDOH — SOCIAL STABILITY: SOCIAL INSECURITY: ARE THERE ANY APPARENT SIGNS OF INJURIES/BEHAVIORS THAT COULD BE RELATED TO ABUSE/NEGLECT?: UNABLE TO ASSESS

## 2025-06-24 SDOH — HEALTH STABILITY: MENTAL HEALTH
DO YOU FEEL STRESS - TENSE, RESTLESS, NERVOUS, OR ANXIOUS, OR UNABLE TO SLEEP AT NIGHT BECAUSE YOUR MIND IS TROUBLED ALL THE TIME - THESE DAYS?: PATIENT UNABLE TO ANSWER

## 2025-06-24 SDOH — HEALTH STABILITY: PHYSICAL HEALTH
ON AVERAGE, HOW MANY DAYS PER WEEK DO YOU ENGAGE IN MODERATE TO STRENUOUS EXERCISE (LIKE A BRISK WALK)?: PATIENT UNABLE TO ANSWER

## 2025-06-24 SDOH — HEALTH STABILITY: PHYSICAL HEALTH
HOW OFTEN DO YOU NEED TO HAVE SOMEONE HELP YOU WHEN YOU READ INSTRUCTIONS, PAMPHLETS, OR OTHER WRITTEN MATERIAL FROM YOUR DOCTOR OR PHARMACY?: PATIENT UNABLE TO RESPOND

## 2025-06-24 SDOH — SOCIAL STABILITY: SOCIAL INSECURITY
WITHIN THE LAST YEAR, HAVE YOU BEEN HUMILIATED OR EMOTIONALLY ABUSED IN OTHER WAYS BY YOUR PARTNER OR EX-PARTNER?: PATIENT UNABLE TO ANSWER

## 2025-06-24 SDOH — SOCIAL STABILITY: SOCIAL INSECURITY: ABUSE: ADULT

## 2025-06-24 SDOH — ECONOMIC STABILITY: HOUSING INSECURITY
IN THE LAST 12 MONTHS, WAS THERE A TIME WHEN YOU WERE NOT ABLE TO PAY THE MORTGAGE OR RENT ON TIME?: PATIENT UNABLE TO ANSWER

## 2025-06-24 SDOH — SOCIAL STABILITY: SOCIAL INSECURITY: HAVE YOU HAD THOUGHTS OF HARMING ANYONE ELSE?: UNABLE TO ASSESS

## 2025-06-24 SDOH — SOCIAL STABILITY: SOCIAL INSECURITY
WITHIN THE LAST YEAR, HAVE YOU BEEN KICKED, HIT, SLAPPED, OR OTHERWISE PHYSICALLY HURT BY YOUR PARTNER OR EX-PARTNER?: PATIENT UNABLE TO ANSWER

## 2025-06-24 SDOH — ECONOMIC STABILITY: HOUSING INSECURITY: AT ANY TIME IN THE PAST 12 MONTHS, WERE YOU HOMELESS OR LIVING IN A SHELTER (INCLUDING NOW)?: PATIENT UNABLE TO ANSWER

## 2025-06-24 SDOH — SOCIAL STABILITY: SOCIAL NETWORK
DO YOU BELONG TO ANY CLUBS OR ORGANIZATIONS SUCH AS CHURCH GROUPS, UNIONS, FRATERNAL OR ATHLETIC GROUPS, OR SCHOOL GROUPS?: PATIENT UNABLE TO ANSWER

## 2025-06-24 SDOH — ECONOMIC STABILITY: TRANSPORTATION INSECURITY
IN THE PAST 12 MONTHS, HAS LACK OF TRANSPORTATION KEPT YOU FROM MEDICAL APPOINTMENTS OR FROM GETTING MEDICATIONS?: PATIENT UNABLE TO ANSWER

## 2025-06-24 SDOH — SOCIAL STABILITY: SOCIAL INSECURITY: ARE YOU MARRIED, WIDOWED, DIVORCED, SEPARATED, NEVER MARRIED, OR LIVING WITH A PARTNER?: PATIENT UNABLE TO ANSWER

## 2025-06-24 SDOH — SOCIAL STABILITY: SOCIAL NETWORK: HOW OFTEN DO YOU GET TOGETHER WITH FRIENDS OR RELATIVES?: PATIENT UNABLE TO ANSWER

## 2025-06-24 SDOH — SOCIAL STABILITY: SOCIAL INSECURITY: ARE YOU OR HAVE YOU BEEN THREATENED OR ABUSED PHYSICALLY, EMOTIONALLY, OR SEXUALLY BY ANYONE?: UNABLE TO ASSESS

## 2025-06-24 SDOH — SOCIAL STABILITY: SOCIAL INSECURITY: WERE YOU ABLE TO COMPLETE ALL THE BEHAVIORAL HEALTH SCREENINGS?: NO

## 2025-06-24 SDOH — SOCIAL STABILITY: SOCIAL INSECURITY: DOES ANYONE TRY TO KEEP YOU FROM HAVING/CONTACTING OTHER FRIENDS OR DOING THINGS OUTSIDE YOUR HOME?: UNABLE TO ASSESS

## 2025-06-24 ASSESSMENT — ACTIVITIES OF DAILY LIVING (ADL)
PATIENT'S MEMORY ADEQUATE TO SAFELY COMPLETE DAILY ACTIVITIES?: UNABLE TO ASSESS
LACK_OF_TRANSPORTATION: PATIENT UNABLE TO ANSWER
JUDGMENT_ADEQUATE_SAFELY_COMPLETE_DAILY_ACTIVITIES: UNABLE TO ASSESS
GROOMING: DEPENDENT
FEEDING YOURSELF: DEPENDENT
DRESSING YOURSELF: DEPENDENT
HEARING - LEFT EAR: UNABLE TO ASSESS
ADEQUATE_TO_COMPLETE_ADL: UNABLE TO ASSESS
HEARING - RIGHT EAR: UNABLE TO ASSESS
TOILETING: DEPENDENT
LACK_OF_TRANSPORTATION: PATIENT UNABLE TO ANSWER
BATHING: DEPENDENT
WALKS IN HOME: DEPENDENT
LACK_OF_TRANSPORTATION: NO

## 2025-06-24 ASSESSMENT — LIFESTYLE VARIABLES
SKIP TO QUESTIONS 9-10: 0
HOW OFTEN DO YOU HAVE A DRINK CONTAINING ALCOHOL: PATIENT UNABLE TO ANSWER
HOW OFTEN DO YOU HAVE 6 OR MORE DRINKS ON ONE OCCASION: PATIENT UNABLE TO ANSWER
AUDIT-C TOTAL SCORE: -1
HOW MANY STANDARD DRINKS CONTAINING ALCOHOL DO YOU HAVE ON A TYPICAL DAY: PATIENT UNABLE TO ANSWER
AUDIT-C TOTAL SCORE: -1

## 2025-06-24 ASSESSMENT — COGNITIVE AND FUNCTIONAL STATUS - GENERAL
STANDING UP FROM CHAIR USING ARMS: TOTAL
MOBILITY SCORE: 6
CLIMB 3 TO 5 STEPS WITH RAILING: TOTAL
MOVING FROM LYING ON BACK TO SITTING ON SIDE OF FLAT BED WITH BEDRAILS: TOTAL
HELP NEEDED FOR BATHING: TOTAL
CLIMB 3 TO 5 STEPS WITH RAILING: TOTAL
DRESSING REGULAR LOWER BODY CLOTHING: TOTAL
MOVING TO AND FROM BED TO CHAIR: TOTAL
WALKING IN HOSPITAL ROOM: TOTAL
DAILY ACTIVITIY SCORE: 6
DRESSING REGULAR UPPER BODY CLOTHING: TOTAL
MOVING FROM LYING ON BACK TO SITTING ON SIDE OF FLAT BED WITH BEDRAILS: TOTAL
TOILETING: TOTAL
HELP NEEDED FOR BATHING: TOTAL
DAILY ACTIVITIY SCORE: 6
EATING MEALS: TOTAL
TURNING FROM BACK TO SIDE WHILE IN FLAT BAD: TOTAL
EATING MEALS: TOTAL
STANDING UP FROM CHAIR USING ARMS: TOTAL
DRESSING REGULAR UPPER BODY CLOTHING: TOTAL
MOBILITY SCORE: 6
DRESSING REGULAR LOWER BODY CLOTHING: TOTAL
TURNING FROM BACK TO SIDE WHILE IN FLAT BAD: TOTAL
PATIENT BASELINE BEDBOUND: YES
TOILETING: TOTAL
PERSONAL GROOMING: TOTAL
MOVING TO AND FROM BED TO CHAIR: TOTAL
PERSONAL GROOMING: TOTAL
WALKING IN HOSPITAL ROOM: TOTAL

## 2025-06-24 ASSESSMENT — PAIN SCALES - PAIN ASSESSMENT IN ADVANCED DEMENTIA (PAINAD)
TOTALSCORE: 0
FACIALEXPRESSION: SMILING OR INEXPRESSIVE
BODYLANGUAGE: RELAXED
CONSOLABILITY: NO NEED TO CONSOLE
BODYLANGUAGE: RELAXED
TOTALSCORE: 0
BREATHING: NORMAL
CONSOLABILITY: NO NEED TO CONSOLE
BREATHING: NORMAL
FACIALEXPRESSION: SMILING OR INEXPRESSIVE

## 2025-06-24 ASSESSMENT — PAIN SCALES - GENERAL: PAINLEVEL_OUTOF10: 0 - NO PAIN

## 2025-06-24 ASSESSMENT — PAIN SCALES - WONG BAKER: WONGBAKER_NUMERICALRESPONSE: NO HURT

## 2025-06-24 ASSESSMENT — PAIN - FUNCTIONAL ASSESSMENT
PAIN_FUNCTIONAL_ASSESSMENT: UNABLE TO SELF-REPORT
PAIN_FUNCTIONAL_ASSESSMENT: UNABLE TO SELF-REPORT

## 2025-06-24 NOTE — PROGRESS NOTES
Emergency Department Transition of Care Note       Signout   I received Niharika Pandya in signout from Dr. Mayberry.  Please see the ED Provider Note for all HPI, PE and MDM up to the time of signout at 2300.  This is in addition to the primary record.    In brief Niharika Pandya is an 89 y.o. female presenting for worsening altered mental status.     At the time of signout we were awaiting:  CT results.  Lab results.    ED Course & Medical Decision Making   Medical Decision Making:  Under my care,   CT imaging revealed evidence of colitis and dehydration.  CT head was negative for any acute abnormality.  Labs showed evidence of a UTI.  Patient started on ceftriaxone and given additional IV fluids.  Patient admitted the hospital for continued treatment.    ED Course:  ED Course as of 06/24/25 0254 Mon Jun 23, 2025 2210 Troponin I Series, High Sensitivity (0, 1 HR) [AC]   2210 Comprehensive metabolic panel(!)  Electrolytes within normal limits.  [AC]   2211 VBG notable for mild acidosis at 7.31.  pCO2 45.  iCal decreased at 1.05.  Lactate 2.8.  CO-OX with HGB of 0.2.  [AC]   2212 CBC and Auto Differential(!)  No leukocytosis. Hgb 10, decreased from baseline of 11.54 months ago.  No thrombocytopenia. [AC]      ED Course User Index  [AC] Juan Mayberry DO         Diagnoses as of 06/24/25 0254   UTI (urinary tract infection), bacterial       Disposition   As a result of their workup, the patient will require admission to the hospital.  The patient was informed of her diagnosis.  The patient was given the opportunity to ask questions and I answered them. The patient agreed to be admitted to the hospital.    Procedures   Procedures    Patient seen and discussed with ED attending physician.    Osmel Trimble DO  Emergency Medicine

## 2025-06-24 NOTE — CARE PLAN
The patient's goals for the shift include      The clinical goals for the shift include Patient will remain safe and HDS this shift      Problem: Pain - Adult  Goal: Verbalizes/displays adequate comfort level or baseline comfort level  Outcome: Progressing     Problem: Safety - Adult  Goal: Free from fall injury  Outcome: Progressing     Problem: Discharge Planning  Goal: Discharge to home or other facility with appropriate resources  Outcome: Progressing     Problem: Chronic Conditions and Co-morbidities  Goal: Patient's chronic conditions and co-morbidity symptoms are monitored and maintained or improved  Outcome: Progressing     Problem: Nutrition  Goal: Nutrient intake appropriate for maintaining nutritional needs  Outcome: Progressing     Problem: Skin  Goal: Decreased wound size/increased tissue granulation at next dressing change  Outcome: Progressing  Flowsheets (Taken 6/24/2025 1426)  Decreased wound size/increased tissue granulation at next dressing change: Protective dressings over bony prominences  Goal: Participates in plan/prevention/treatment measures  Outcome: Progressing  Flowsheets (Taken 6/24/2025 1426)  Participates in plan/prevention/treatment measures:   Elevate heels   Discuss with provider PT/OT consult  Goal: Prevent/manage excess moisture  Outcome: Progressing  Flowsheets (Taken 6/24/2025 1426)  Prevent/manage excess moisture:   Cleanse incontinence/protect with barrier cream   Moisturize dry skin  Goal: Prevent/minimize sheer/friction injuries  Outcome: Progressing  Flowsheets (Taken 6/24/2025 1426)  Prevent/minimize sheer/friction injuries:   Complete micro-shifts as needed if patient unable. Adjust patient position to relieve pressure points, not a full turn   HOB 30 degrees or less   Turn/reposition every 2 hours/use positioning/transfer devices   Use pull sheet  Goal: Promote/optimize nutrition  Outcome: Progressing  Flowsheets (Taken 6/24/2025 1426)  Promote/optimize nutrition:    Assist with feeding   Consume > 50% meals/supplements   Monitor/record intake including meals   Offer water/supplements/favorite foods  Goal: Promote skin healing  Outcome: Progressing  Flowsheets (Taken 6/24/2025 1426)  Promote skin healing:   Assess skin/pad under line(s)/device(s)   Protective dressings over bony prominences   Turn/reposition every 2 hours/use positioning/transfer devices   Rotate device position/do not position patient on device

## 2025-06-24 NOTE — ED PROVIDER NOTES
History of Present Illness     History provided by: Family Member and EMS  Limitations to History: Dementia  External Records Reviewed with Brief Summary: None    HPI:  Niharika Pandya is a 89 y.o. 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 Patient's daughter reports that patient was in her room and when she went to go check on her she was slumped over, drooling with her mouth open.  She started patting patient with cool water and the patient became more responsive.  Otherwise, daughter states no new changes in patient's baseline mentation. Patient has had an episode of vomiting prior to arrival. She notes patient was able to eat earlier today without any difficulties and nursing aides who came to the house did not know any abnormalities.       Physical Exam   Triage vitals:  T 36.9 °C (98.4 °F)  HR 70  BP (!) 82/41  RR 18  O2 98 % None (Room air)    General: Leaning forward in bed, opens eyes to voice  Eyes: Gaze conjugate. PERRLA.   HENT: Normo-cephalic, atraumatic. No stridor. Dry mucous membranes.   CV: Regular rate, regular rhythm. Radial pulses 2+ bilaterally. DP pulses 2 + bilaterally. No murmurs.  Resp: Breathing non-labored, speaking in full sentences.  Clear to auscultation bilaterally. No wheezing, rales, rhonchi.   GI: Soft, non-distended, non-tender. No rebound or guarding.   MSK/Extremities: No gross bony deformities. Upper extremities contracted but moving extremities to pain.   Skin: Warm. Appropriate color. Mildly diaphoretic.   Neuro: Alert and oriented x0, nonverbal at baseline. Will look to voice. Does not follow commands.   Psych: Appropriate mood and affect      Medical Decision Making & ED Course   Medical Decision Makin y.o. 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 On arrival, EMS expressed concern that patient was unresponsive, but on assessment, she was opening her eye to voice and tracking movements. Given her only change from baseline was this slumping over per daughter, lower concern for CVA. Differential diagnosis includes but is not limited to CVA, gastroenteritis, ACS, PNA, UTI, viral syndrome, dehydration, heat stroke, electrolyte abnormalities. Broad workup with labs and imaging to be obtained given patient is unable to provide additional information. Anticipate patient will likely need to be admitted pending her labs and imaging.     Her CBC shows no leukocytosis.  Hemoglobin of 10 which is decreased from 11.54 months ago, no thrombocytopenia.  This could be mildly hemoconcentrated as patient does appear dry.  Given patient is vomiting and has dry mucous membranes, will give 500 cc of fluid.  Her last echo was in 2023 showing adequate cardiac function, but would like to see how patient responds to smaller bolus of fluids initially.    ED Course:  ED Course as of 06/26/25 1236   Mon Jun 23, 2025   2210 Troponin I Series, High Sensitivity (0, 1 HR) [AC]   2210 Comprehensive metabolic panel(!)  Electrolytes within normal limits.  [AC]   2211 VBG notable for mild acidosis at 7.31.  pCO2 45.  iCal decreased at 1.05.  Lactate 2.8.  CO-OX with HGB of 0.2.  [AC]   2212 CBC and Auto Differential(!)  No leukocytosis. Hgb 10, decreased from baseline of 11.54 months ago.  No thrombocytopenia. [AC]      ED Course User Index  [AC] Juan Mayberry DO         Diagnoses as of 06/26/25 1236   UTI (urinary tract infection), bacterial       EKG Independent Interpretation: EKG obtained at 20: 35 independently interpreted by me.  It demonstrates normal sinus rhythm with rate of 96.  Appropriate intervals.  No ST elevations.  No significant changes when compared to EKG from  2/12/2025.        Disposition   Patient was signed out to oncoming provider at 2300 pending completion of their work-up.  Please see the next provider's transition of care note for the remainder of the patient's care.     Procedures   Procedures        Juan Mayberry DO  Emergency Medicine PGY1     Juan Mayberry DO  Resident  06/26/25 1310

## 2025-06-24 NOTE — PROGRESS NOTES
06/24/25 1422   Discharge Planning   Living Arrangements Children   Support Systems Children   Assistance Needed Dependent for adls/iadls   Type of Residence Private residence   Number of Stairs to Enter Residence 3   Number of Stairs Within Residence 13   Home or Post Acute Services In home services   Type of Home Care Services Home health aide   Expected Discharge Disposition Home   Does the patient need discharge transport arranged? Yes   RoundTrip coordination needed? Yes   Has discharge transport been arranged? No   Financial Resource Strain   How hard is it for you to pay for the very basics like food, housing, medical care, and heating? Not hard   Housing Stability   In the last 12 months, was there a time when you were not able to pay the mortgage or rent on time? N   Transportation Needs   In the past 12 months, has lack of transportation kept you from medical appointments or from getting medications? no   In the past 12 months, has lack of transportation kept you from meetings, work, or from getting things needed for daily living? No   Patient Choice   Provider Choice list and CMS website (https://medicare.gov/care-compare#search) for post-acute Quality and Resource Measure Data were provided and reviewed with: Family   Patient / Family choosing to utilize agency / facility established prior to hospitalization Yes   Stroke Family Assessment   Stroke Family Assessment Needed No   Intensity of Service   Intensity of Service 0-30 min     Transitional Care Coordination Progress Note:  Patient discussed during interdisciplinary rounds.   Team members present: ARIELLE DORANTES  Plan per Medical/Surgical team: UTI  Payor: [facility]  Discharge disposition: Home   Potential Barriers: none  ADOD: 1-2 days    Previous Home Care: [facility] 2x/day (2-3.5hrs per day), 7 days per week  DME: Hospital bed, air mattress  Pharmacy: [facility]  Falls: Denies  PCP:  NP through [facility] makes house calls; last visit May  Imaging Studies/Medications 2025  Met with daughter [place] at bedside, provided introduction of self and role. Patient lives at home with daughter. Requires total care for adls/iadls. Patient has [facility] through [facility], family assists with care as well. Daughter reports no concerns obtaining/affording medications; states no social/financial concerns. Daughter states they will need assist with transport home at time of discharge. Will continue to follow for discharge planning needs.     Shirley RAMOS, RN  Transitional Care Coordinator (TCC)  [phone]

## 2025-06-24 NOTE — H&P
History and Physical Note  Patient: Niharika Pandya   Age: 89 y.o. Gender: female     History of Present Illness:   Admission Reason:   Chief Complaint   Patient presents with    Altered Mental Status     HPI:   Niharika Pandya is a 89 y.o. 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     Per report patient's daughter reports that patient was in her room and when she went to go check on her she was slumped over, drooling with her mouth open.  Similar episode occurred in February where she was admitted for similar findings and had UTI.     Patient arrived hemodynamically stable. CTH with no acute findings. CT A/P - sigmoid colon wall thickening, c/f infectious vs inflammatory colitis. UA - +LE, +WBC, Ucx pending.   She was seen at bedside. Pt opens eyes but did not respond. Makes eye contract. Unable to obtain history from patient.        Review of Systems:  Review of Systems    Allergies:   RX Allergies[1]    Past Medical History:  Medical History[2]    Past Surgical History:   Surgical History[3]    Family History:  Family History[4]    Social History:  Tobacco Use History[5]    Social History     Substance and Sexual Activity   Alcohol Use Not on file       Social History     Substance and Sexual Activity   Drug Use Not on file       Home Medications:  Current Outpatient Medications   Medication Instructions    mineral oil-hydrophilic petrolatum (Aquaphor) ointment Daily    olopatadine HCl (EYE ALLERGY ITCH RELIEF OPHT) 1 drop, Daily PRN    ondansetron (ZOFRAN) 8 mg, Every 8 hours PRN    pantoprazole (PROTONIX) 20 mg, Daily before breakfast    polyethylene glycol (GLYCOLAX, MIRALAX) 17 g, oral, Daily, Please take for constipation    rivaroxaban (XARELTO) 20 mg, Daily with evening meal       Vitals:  Visit Vitals  /67 (BP Location: Right arm, Patient Position:  Lying)   Pulse 95   Temp 37.4 °C (99.4 °F) (Axillary)   Resp 18   SpO2 98%       Physical Exam  Constitutional:       Comments: Awake, makes eye contact. Does not follow commands or answer questions    Cardiovascular:      Rate and Rhythm: Normal rate and regular rhythm.   Pulmonary:      Effort: Pulmonary effort is normal.      Breath sounds: Normal breath sounds.   Abdominal:      Palpations: Abdomen is soft.   Musculoskeletal:      Right lower leg: No edema.      Left lower leg: No edema.   Neurological:      Mental Status: Mental status is at baseline.      Comments: A&OX0  Eyes open  Doesn't follow commands          Labs and Imaging Results:  Lab Results   Component Value Date    WBC 11.0 06/23/2025       CT abdomen pelvis w IV contrast  Narrative: Interpreted By:  Kevin De La Rosa,   STUDY:  CT ABDOMEN PELVIS W IV CONTRAST;  6/24/2025 12:58 am      INDICATION:  Signs/Symptoms:n/v.          COMPARISON:  CT of the abdomen and pelvis dated 02/13/2025.      ACCESSION NUMBER(S):  [id number]      ORDERING CLINICIAN:  ARIS HOLLIS      TECHNIQUE:  Contiguous axial images of the abdomen and pelvis were obtained after  the intravenous administration of 75 mL of Omnipaque 350 iodinated  contrast. Coronal and sagittal reformatted images were reconstructed  from the axial data.      FINDINGS:  LOWER CHEST: Atelectatic changes are present in the lower lobes  bilaterally without evidence of consolidation or sizable effusion.      Heart is normal in size without pericardial effusion.      Distal esophagus does not demonstrate any acute abnormality.          ABDOMEN/PELVIS:      ABDOMINAL WALL: Cutaneous tissues of the abdomen and pelvis do not  demonstrate any acute findings.      LIVER: No acute hepatic parenchymal abnormality is present. Portal  vein is unremarkable in appearance.      BILE DUCTS: No significant intrahepatic or extrahepatic dilatation.      GALLBLADDER: No gallbladder wall thickening or  pericholecystic  stranding is identified.      PANCREAS: No pancreatic ductal dilatation or peripancreatic stranding  is evident. There is atrophy of the pancreatic parenchyma.      SPLEEN: No acute splenic abnormalities are present.      ADRENALS: No acute adrenal abnormalities are present.      KIDNEYS, URETERS, BLADDER: Kidneys are symmetric in size and  enhancement without evidence of hydronephrosis. No radiopaque stones  are identified. Several hypodensities bilaterally likely represent  cysts.      There is slight asymmetric posterior bladder wall thickening, favored  to be reactive in etiology.      REPRODUCTIVE ORGANS: Uterus is present. No discrete adnexal masses or  collections are identified.      VESSELS: An infrarenal IVC filter is in place, with the IVC appearing  flattened. Atherosclerotic plaques are present in the abdominal aorta  without evidence of acute vascular abnormality.      RETROPERITONEUM/LYMPH NODES: No acute retroperitoneal abnormality. No  enlarged lymph nodes.      BOWEL/MESENTERY/PERITONEUM: No abnormal small bowel dilatation is  present. Long segment of the inflammatory bowel wall thickening is  present in the sigmoid colon and proximal rectum with mild adjacent  fat stranding (series 204, image 72. Normal appendix.      Trace free fluid may be present in the pelvis without evidence of  free air or thick-walled collections.          MUSCULOSKELETAL: No acute osseous abnormality. No suspicious osseous  lesion. Multilevel degenerative changes are present in the spine.      Impression: 1. Long segment of the inflammatory wall thickening is present in the  sigmoid colon and proximal rectum, correlate with symptoms of  infectious/inflammatory colitis.  2. Flattened IVC. Correlate with fluid volume status and hypovolemia.  3. Mild wall thickening in the posterior bladder is favored to be  reactive secondary to underlying sigmoid pathology, although  correlation with urinalysis to exclude  any underlying cystitis is  recommended.      MACRO:  None.      Signed by: Kevin De La Rosa 6/24/2025 1:43 AM  Dictation workstation:   [id number]  CT head wo IV contrast  Narrative: Interpreted By:  Kevin De La Rosa,   STUDY:  CT HEAD WO IV CONTRAST;  6/24/2025 12:58 am      INDICATION:  Signs/Symptoms:n/v, overheated, dementia.          COMPARISON:  CT head dated 02/13/2025.      ACCESSION NUMBER(S):  [id number]      ORDERING CLINICIAN:  ARIS HOLLIS      TECHNIQUE:  Noncontrast axial CT scan of head was performed. Angled reformats in  brain and bone windows were generated. The images were reviewed in  bone, brain, blood and soft tissue windows.      FINDINGS:  No hyperdense intracranial hemorrhage is present. There is no mass  effect or midline shift identified.      Gray-white differentiation is intact, without evidence of acute CT  apparent transcortical infarct. Mild-to-moderate volume of the  attenuation changes present in the periventricular and subcortical  white matter of bilateral cerebral hemispheres is nonspecific, but  favored to represent sequela of microvascular disease.      There is somewhat disproportionate enlargement of the supratentorial  ventricular system relative to basal cisterns and sulci, favored to  represent asymmetric central volume loss. No abnormal extra-axial  fluid collection is present. Basal cisterns are patent.      Scalp soft tissues do not demonstrate any acute abnormality. No acute  calvarial abnormality is present.      Mastoid air cells and middle ear cavities are clear. Visualized  paranasal sinuses are unremarkable in appearance.      Impression: 1.  No evidence of hemorrhage, CT apparent transcortical infarct, or  other acute intracranial abnormality.  2. Somewhat disproportionate enlargement of the supratentorial  ventricular system relative to basal cisterns and sulci, favored to  represent asymmetric central volume loss, although normal  pressure  hydrocephalus may have similar imaging appearance.  3.  Mild-to-moderate volume of the attenuation changes are present in  the periventricular and subcortical white matter of bilateral  cerebral hemispheres, nonspecific findings favored to represent  sequela of microvascular disease.      MACRO:  None      Signed by: Kevin De La Rosa 6/24/2025 1:33 AM  Dictation workstation:   [id number]  XR chest 2 views  Narrative: Interpreted By:  Kevin De La Rosa,   STUDY:  XR CHEST 2 VIEWS;  6/24/2025 12:54 am      INDICATION:  Signs/Symptoms:n/v, AMS.          COMPARISON:  Radiographs of the chest dated 02/13/2025.      ACCESSION NUMBER(S):  [id number]      ORDERING CLINICIAN:  ARIS HOLLIS      FINDINGS:  PA and lateral radiographs of the chest were provided.              CARDIOMEDIASTINAL SILHOUETTE:  Cardiomediastinal silhouette is normal in size and configuration.      LUNGS:  Images are somewhat degraded by low lung volumes and suboptimal  positioning. Within limits of the study no sizable consolidation or  pleural effusion is present.      ABDOMEN:  No remarkable upper abdominal findings.      BONES:  No acute osseous changes.      Impression: 1.  Somewhat low lung volumes and suboptimal positioning. Within  limits of the study no consolidation or sizable pleural effusion is  evident.              MACRO:  None      Signed by: Kevin De La Rosa 6/24/2025 1:30 AM  Dictation workstation:   [id number]      Assessment and Plan:    Assessment & Plan    Niharika Pandya is a 89 y.o. 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 Admitted for confusion, UTI.       #Advanced dementia  -baseline A&Ox0-1, appears to be at her baseline   -Episode of confusion perhaps  2/2 UTI vs coliits?  -IVF given in ED  -continue ceftriaxone  -ucx pending    #Hx of  DVT  -Xarelto     N: regular  DVT: xarelto      Skye Oneal DO  Hospitalist               [1] No Known Allergies  [2]   Past Medical History:  Diagnosis Date    Personal history of other venous thrombosis and embolism 01/30/2020    History of deep venous thrombosis    Personal history of other venous thrombosis and embolism 09/13/2020    History of deep venous thrombosis    Personal history of pulmonary embolism 09/13/2020    History of pulmonary embolism    Unspecified dementia, unspecified severity, without behavioral disturbance, psychotic disturbance, mood disturbance, and anxiety (Multi) 11/22/2020    Dementia   [3]   Past Surgical History:  Procedure Laterality Date    OTHER SURGICAL HISTORY  01/27/2020    Inferior vena cava filter placement   [4] No family history on file.  [5]   Social History  Tobacco Use   Smoking Status Not on file   Smokeless Tobacco Not on file

## 2025-06-24 NOTE — PROGRESS NOTES
Pharmacy Medication History Review    Niharika Pandya is a 89 y.o. female admitted for No Principal Problem: There is no principal problem currently on the Problem List. Please update the Problem List and refresh.. Pharmacy reviewed the patient's [id number] medications and allergies for accuracy.    Medications ADDED:  Aquaphor ointment   Olopatadine Ophthalmic Solution   Ondansetron 4 mg tablet   Medications CHANGED:  None  Medications REMOVED:   None    The list below reflects the updated PTA list.   Prior to Admission Medications   Prescriptions Last Dose Informant   mineral oil-hydrophilic petrolatum (Aquaphor) ointment 6/23/2025 Morning Child   Sig: Apply topically once daily.   olopatadine HCl (EYE ALLERGY ITCH RELIEF OPHT) Past Week Child   Sig: Administer 1 drop into both eyes once daily as needed (dry or itchy eyes).   ondansetron (Zofran) 4 mg tablet More than a month Child   Sig: Take 2 tablets (8 mg) by mouth every 8 hours if needed for nausea or vomiting.   pantoprazole (ProtoNix) 20 mg EC tablet 6/23/2025 Morning Child   Sig: Take 1 tablet (20 mg) by mouth once daily in the morning. Take before meals. Do not crush, chew, or split.   polyethylene glycol (Glycolax, Miralax) 17 gram/dose powder More than a month Child   Sig: Mix 17 g of powder and drink once daily. Please take for constipation   rivaroxaban (Xarelto) 20 mg tablet 6/23/2025 at  5:00 PM Child   Sig: Take 1 tablet (20 mg) by mouth once daily in the evening. Take with meals. Take with food.      Facility-Administered Medications: None        The list below reflects the updated allergy list. Please review each documented allergy for additional clarification and justification.  Allergies  Reviewed by Denise Garcia on 6/24/2025   No Known Allergies         Patient accepts M2B at discharge.     Sources:   [facility] - no hx found   Interview with patients daughter via phone call ( Nidia Pandya [phone] )   Epic medication dispense hx  "report    chart review    admission med rec grid   CE     Additional Comments:  Hx provided by patients daughter via phone call who helps manage medications for the patient at home , patients daughter is a reliable historian.     Patients daughter states patient receives Xarelto and Pantoprazole via [facility] mail order pharmacy.           Denise Garcia  Pharmacy Technician  06/24/25     Secure Chat preferred   If no response call [phone] or Vocera \"Med Rec\"   "

## 2025-06-24 NOTE — ED TRIAGE NOTES
Pt BIB [facility] from home for altered mental status. Pt's daughter who is her primary caregiver at bedside to provide history. Pt has hx of advanced dementia and is nonverbal at baseline, but pt's daughter reports that pt became less responsive with new head drooping and drooling starting around 1800 today. Pt's daughter reports that she fed pt dinner around 1700 and pt was at her baseline at this time; around 1800 pt became more lethargic with head drooping and drooling. Pt's daughter and a neighbor attempted to help pt by cooling her with damp rags but pt's mental status did not improve so she called EMS. Pt's daughter reports pt's room has air conditioning.     EMS reports pt vomited 2x when they arrived. VS and BGL WDL for EMS. Pt's skin warm and diaphoretic. Upon arrival pt is alert but unable to follow commands. Initial BP reading of 82/41 on L arm repeated on R arm due to severe contracture of bilateral upper extremities; repeat /67 on R arm. Oral temp 98.4, axillary temp 99.4. HR and SpO2 WDL on room air.

## 2025-06-25 LAB
ALBUMIN SERPL BCP-MCNC: 3 G/DL (ref 3.4–5)
ANION GAP SERPL CALC-SCNC: 10 MMOL/L (ref 10–20)
BUN SERPL-MCNC: 12 MG/DL (ref 6–23)
CALCIUM SERPL-MCNC: 8.3 MG/DL (ref 8.6–10.6)
CHLORIDE SERPL-SCNC: 107 MMOL/L (ref 98–107)
CO2 SERPL-SCNC: 26 MMOL/L (ref 21–32)
COHGB MFR BLD: 8.4 % (ref 0–3.6)
CREAT SERPL-MCNC: 0.73 MG/DL (ref 0.5–1.05)
EGFRCR SERPLBLD CKD-EPI 2021: 78 ML/MIN/1.73M*2
ERYTHROCYTE [DISTWIDTH] IN BLOOD BY AUTOMATED COUNT: 16.2 % (ref 11.5–14.5)
GLUCOSE SERPL-MCNC: 104 MG/DL (ref 74–99)
HCT VFR BLD AUTO: 34.9 % (ref 36–46)
HGB BLD-MCNC: 10.5 G/DL (ref 12–16)
HOLD SPECIMEN: NORMAL
MAGNESIUM SERPL-MCNC: 2.17 MG/DL (ref 1.6–2.4)
MCH RBC QN AUTO: 23 PG (ref 26–34)
MCHC RBC AUTO-ENTMCNC: 30.1 G/DL (ref 32–36)
MCV RBC AUTO: 77 FL (ref 80–100)
NRBC BLD-RTO: 0 /100 WBCS (ref 0–0)
PHOSPHATE SERPL-MCNC: 3.2 MG/DL (ref 2.5–4.9)
PLATELET # BLD AUTO: 287 X10*3/UL (ref 150–450)
POTASSIUM SERPL-SCNC: 3.6 MMOL/L (ref 3.5–5.3)
RBC # BLD AUTO: 4.56 X10*6/UL (ref 4–5.2)
SODIUM SERPL-SCNC: 139 MMOL/L (ref 136–145)
WBC # BLD AUTO: 12.7 X10*3/UL (ref 4.4–11.3)

## 2025-06-25 PROCEDURE — 1100000001 HC PRIVATE ROOM DAILY

## 2025-06-25 PROCEDURE — 83735 ASSAY OF MAGNESIUM: CPT | Performed by: STUDENT IN AN ORGANIZED HEALTH CARE EDUCATION/TRAINING PROGRAM

## 2025-06-25 PROCEDURE — 2500000001 HC RX 250 WO HCPCS SELF ADMINISTERED DRUGS (ALT 637 FOR MEDICARE OP): Performed by: STUDENT IN AN ORGANIZED HEALTH CARE EDUCATION/TRAINING PROGRAM

## 2025-06-25 PROCEDURE — 85027 COMPLETE CBC AUTOMATED: CPT | Performed by: STUDENT IN AN ORGANIZED HEALTH CARE EDUCATION/TRAINING PROGRAM

## 2025-06-25 PROCEDURE — 2500000004 HC RX 250 GENERAL PHARMACY W/ HCPCS (ALT 636 FOR OP/ED): Performed by: INTERNAL MEDICINE

## 2025-06-25 PROCEDURE — 80069 RENAL FUNCTION PANEL: CPT | Performed by: STUDENT IN AN ORGANIZED HEALTH CARE EDUCATION/TRAINING PROGRAM

## 2025-06-25 PROCEDURE — 2500000004 HC RX 250 GENERAL PHARMACY W/ HCPCS (ALT 636 FOR OP/ED): Performed by: STUDENT IN AN ORGANIZED HEALTH CARE EDUCATION/TRAINING PROGRAM

## 2025-06-25 PROCEDURE — 2500000002 HC RX 250 W HCPCS SELF ADMINISTERED DRUGS (ALT 637 FOR MEDICARE OP, ALT 636 FOR OP/ED): Performed by: INTERNAL MEDICINE

## 2025-06-25 PROCEDURE — 36415 COLL VENOUS BLD VENIPUNCTURE: CPT | Performed by: STUDENT IN AN ORGANIZED HEALTH CARE EDUCATION/TRAINING PROGRAM

## 2025-06-25 PROCEDURE — 99232 SBSQ HOSP IP/OBS MODERATE 35: CPT | Performed by: STUDENT IN AN ORGANIZED HEALTH CARE EDUCATION/TRAINING PROGRAM

## 2025-06-25 RX ORDER — ERTAPENEM 1 G/1
1 INJECTION, POWDER, LYOPHILIZED, FOR SOLUTION INTRAMUSCULAR; INTRAVENOUS EVERY 24 HOURS
Status: DISCONTINUED | OUTPATIENT
Start: 2025-06-25 | End: 2025-06-26 | Stop reason: HOSPADM

## 2025-06-25 RX ADMIN — ESOMEPRAZOLE MAGNESIUM 40 MG: 40 FOR SUSPENSION ORAL at 08:10

## 2025-06-25 RX ADMIN — CEFTRIAXONE SODIUM 1 G: 1 INJECTION, SOLUTION INTRAVENOUS at 06:16

## 2025-06-25 RX ADMIN — RIVAROXABAN 20 MG: 20 TABLET, FILM COATED ORAL at 17:36

## 2025-06-25 RX ADMIN — ERTAPENEM SODIUM 1 G: 1 INJECTION INTRAMUSCULAR; INTRAVENOUS at 21:21

## 2025-06-25 ASSESSMENT — PAIN SCALES - PAIN ASSESSMENT IN ADVANCED DEMENTIA (PAINAD)
CONSOLABILITY: NO NEED TO CONSOLE
BODYLANGUAGE: RELAXED
FACIALEXPRESSION: SMILING OR INEXPRESSIVE
TOTALSCORE: 0
TOTALSCORE: 0
FACIALEXPRESSION: SMILING OR INEXPRESSIVE
CONSOLABILITY: NO NEED TO CONSOLE
BREATHING: NORMAL
BODYLANGUAGE: RELAXED
BREATHING: NORMAL

## 2025-06-25 ASSESSMENT — COGNITIVE AND FUNCTIONAL STATUS - GENERAL
HELP NEEDED FOR BATHING: TOTAL
WALKING IN HOSPITAL ROOM: TOTAL
TURNING FROM BACK TO SIDE WHILE IN FLAT BAD: TOTAL
EATING MEALS: TOTAL
DRESSING REGULAR UPPER BODY CLOTHING: TOTAL
DRESSING REGULAR UPPER BODY CLOTHING: TOTAL
CLIMB 3 TO 5 STEPS WITH RAILING: TOTAL
DRESSING REGULAR LOWER BODY CLOTHING: TOTAL
WALKING IN HOSPITAL ROOM: TOTAL
PERSONAL GROOMING: TOTAL
MOVING FROM LYING ON BACK TO SITTING ON SIDE OF FLAT BED WITH BEDRAILS: TOTAL
MOVING FROM LYING ON BACK TO SITTING ON SIDE OF FLAT BED WITH BEDRAILS: TOTAL
STANDING UP FROM CHAIR USING ARMS: TOTAL
DAILY ACTIVITIY SCORE: 6
TOILETING: TOTAL
PERSONAL GROOMING: TOTAL
MOVING TO AND FROM BED TO CHAIR: TOTAL
DAILY ACTIVITIY SCORE: 6
MOBILITY SCORE: 6
MOBILITY SCORE: 6
MOVING TO AND FROM BED TO CHAIR: TOTAL
DRESSING REGULAR LOWER BODY CLOTHING: TOTAL
HELP NEEDED FOR BATHING: TOTAL
EATING MEALS: TOTAL
TURNING FROM BACK TO SIDE WHILE IN FLAT BAD: TOTAL
TOILETING: TOTAL
STANDING UP FROM CHAIR USING ARMS: TOTAL
CLIMB 3 TO 5 STEPS WITH RAILING: TOTAL

## 2025-06-25 NOTE — CARE PLAN
The patient's goals for the shift include      The clinical goals for the shift include Patient will increase her fluid intake this shift.      Problem: Pain - Adult  Goal: Verbalizes/displays adequate comfort level or baseline comfort level  Outcome: Progressing     Problem: Safety - Adult  Goal: Free from fall injury  Outcome: Progressing     Problem: Discharge Planning  Goal: Discharge to home or other facility with appropriate resources  Outcome: Progressing     Problem: Chronic Conditions and Co-morbidities  Goal: Patient's chronic conditions and co-morbidity symptoms are monitored and maintained or improved  Outcome: Progressing     Problem: Nutrition  Goal: Nutrient intake appropriate for maintaining nutritional needs  Outcome: Progressing     Problem: Skin  Goal: Decreased wound size/increased tissue granulation at next dressing change  Outcome: Progressing  Flowsheets (Taken 6/25/2025 1011)  Decreased wound size/increased tissue granulation at next dressing change:   Promote sleep for wound healing   Protective dressings over bony prominences  Goal: Participates in plan/prevention/treatment measures  Outcome: Progressing  Flowsheets (Taken 6/25/2025 1011)  Participates in plan/prevention/treatment measures:   Elevate heels   Discuss with provider PT/OT consult  Goal: Prevent/manage excess moisture  Outcome: Progressing  Flowsheets (Taken 6/25/2025 1011)  Prevent/manage excess moisture:   Cleanse incontinence/protect with barrier cream   Moisturize dry skin  Goal: Prevent/minimize sheer/friction injuries  Outcome: Progressing  Flowsheets (Taken 6/25/2025 1011)  Prevent/minimize sheer/friction injuries:   Complete micro-shifts as needed if patient unable. Adjust patient position to relieve pressure points, not a full turn   HOB 30 degrees or less   Turn/reposition every 2 hours/use positioning/transfer devices   Use pull sheet  Goal: Promote/optimize nutrition  Outcome: Progressing  Flowsheets (Taken  6/25/2025 1011)  Promote/optimize nutrition:   Assist with feeding   Consume > 50% meals/supplements   Monitor/record intake including meals   Offer water/supplements/favorite foods  Goal: Promote skin healing  Outcome: Progressing  Flowsheets (Taken 6/25/2025 1011)  Promote skin healing:   Assess skin/pad under line(s)/device(s)   Protective dressings over bony prominences   Rotate device position/do not position patient on device   Turn/reposition every 2 hours/use positioning/transfer devices

## 2025-06-25 NOTE — PROGRESS NOTES
"   Medical Group Progress Note  ASSESSMENT & PLAN:   Niharika Pandya is a 89 y.o. 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 Admitted for confusion, UTI.        #Advanced dementia  -baseline A&Ox0-1, appears to be at her baseline   -Episode of confusion perhaps  2/2 UTI vs coliits?    Enterobacter cloacae UTI  -continue ceftriaxone  -urine culture sensitivity pending     #Hx of DVT  -Xarelto      N: regular  DVT: xarelto      Disposition: ADOD 1-2 days    Level of MDM:  Moderate   Risk: Moderate   Data Reviewed and/or Analyzed:   Included: Prior external notes from at least 1 unique source, Results, including laboratory findings and imaging reports, listed above, Orders and notes from all consultants involved, and Notes for this encounter.  I personally reviewed the tests referenced above.    The patient/family had opportunity to ask questions. All questions were answered to the best of my ability.    Nico Jordan, DO  Hospital Medicine    SUBJECTIVE     Patient states to feel well and has no questions.    OBJECTIVE:     Last Recorded Vitals:  Vitals:    06/24/25 1425 06/24/25 1934 06/25/25 0605 06/25/25 1051   BP: 108/80 (!) 115/49 144/67    BP Location:       Patient Position:       Pulse: 66 94 71    Resp: 16 16 16    Temp: 36.4 °C (97.5 °F) 36.6 °C (97.9 °F) 36.2 °C (97.2 °F)    TempSrc:       SpO2: 100% 100% 98%    Weight:       Height:    1.6 m (5' 2.99\")     Last I/O:  I/O last 3 completed shifts:  In: 300 (5.5 mL/kg) [I.V.:300 (5.5 mL/kg)]  Out: - (0 mL/kg)   Weight: 54.4 kg     Physical Exam  Constitutional:       Comments: Awake, in NAD  Cardiovascular:      Rate and Rhythm: Normal rate and regular rhythm.   Pulmonary:      Effort: Pulmonary effort is normal.      Breath sounds: Normal breath sounds.   Abdominal:      Palpations: Abdomen is soft. "   Musculoskeletal:      Right lower leg: No edema.      Left lower leg: No edema.   Neurological:      Mental Status: Mental status is at baseline.      Comments: A&OX0    Inpatient Medications:  Scheduled Medications[1]PRN Medications  PRN Medications[2]  Continuous Medications:  Continuous Medications[3]  LABS AND IMAGING:     Labs:  Results from last 7 days   Lab Units 06/25/25  0600 06/24/25  0654 06/23/25 2044   WBC AUTO x10*3/uL 12.7* 26.8* 11.0   RBC AUTO x10*6/uL 4.56 4.77 4.28   HEMOGLOBIN g/dL 10.5* 11.1* 10.0*   HEMATOCRIT % 34.9* 34.3* 31.4*   MCV fL 77* 72* 73*   MCH pg 23.0* 23.3* 23.4*   MCHC g/dL 30.1* 32.4 31.8*   RDW % 16.2* 15.1* 15.7*   PLATELETS AUTO x10*3/uL 287 304 252     Results from last 7 days   Lab Units 06/25/25  0600 06/24/25  0654 06/23/25  2044   SODIUM mmol/L 139 137 136   POTASSIUM mmol/L 3.6 4.1 3.9   CHLORIDE mmol/L 107 105 107   CO2 mmol/L 26 21 19*   BUN mg/dL 12 16 15   CREATININE mg/dL 0.73 0.76 0.78   GLUCOSE mg/dL 104* 107* 106*   PROTEIN TOTAL g/dL  --   --  5.3*   CALCIUM mg/dL 8.3* 8.4* 7.3*   BILIRUBIN TOTAL mg/dL  --   --  0.3   ALK PHOS U/L  --   --  71   AST U/L  --   --  25   ALT U/L  --   --  22     Results from last 7 days   Lab Units 06/25/25  0600 06/23/25  2044   MAGNESIUM mg/dL 2.17 2.11     Results from last 7 days   Lab Units 06/24/25  0306 06/23/25  2044   [facility] ng/L 9 <3     Imaging:  CT abdomen pelvis w IV contrast  Narrative: Interpreted By:  Kevin De La Rosa,   STUDY:  CT ABDOMEN PELVIS W IV CONTRAST;  6/24/2025 12:58 am      INDICATION:  Signs/Symptoms:n/v.          COMPARISON:  CT of the abdomen and pelvis dated 02/13/2025.      ACCESSION NUMBER(S):  [id number]      ORDERING CLINICIAN:  ARIS HOLLIS      TECHNIQUE:  Contiguous axial images of the abdomen and pelvis were obtained after  the intravenous administration of 75 mL of Omnipaque 350 iodinated  contrast. Coronal and sagittal reformatted images were reconstructed  from the axial  data.      FINDINGS:  LOWER CHEST: Atelectatic changes are present in the lower lobes  bilaterally without evidence of consolidation or sizable effusion.      Heart is normal in size without pericardial effusion.      Distal esophagus does not demonstrate any acute abnormality.          ABDOMEN/PELVIS:      ABDOMINAL WALL: Cutaneous tissues of the abdomen and pelvis do not  demonstrate any acute findings.      LIVER: No acute hepatic parenchymal abnormality is present. Portal  vein is unremarkable in appearance.      BILE DUCTS: No significant intrahepatic or extrahepatic dilatation.      GALLBLADDER: No gallbladder wall thickening or pericholecystic  stranding is identified.      PANCREAS: No pancreatic ductal dilatation or peripancreatic stranding  is evident. There is atrophy of the pancreatic parenchyma.      SPLEEN: No acute splenic abnormalities are present.      ADRENALS: No acute adrenal abnormalities are present.      KIDNEYS, URETERS, BLADDER: Kidneys are symmetric in size and  enhancement without evidence of hydronephrosis. No radiopaque stones  are identified. Several hypodensities bilaterally likely represent  cysts.      There is slight asymmetric posterior bladder wall thickening, favored  to be reactive in etiology.      REPRODUCTIVE ORGANS: Uterus is present. No discrete adnexal masses or  collections are identified.      VESSELS: An infrarenal IVC filter is in place, with the IVC appearing  flattened. Atherosclerotic plaques are present in the abdominal aorta  without evidence of acute vascular abnormality.      RETROPERITONEUM/LYMPH NODES: No acute retroperitoneal abnormality. No  enlarged lymph nodes.      BOWEL/MESENTERY/PERITONEUM: No abnormal small bowel dilatation is  present. Long segment of the inflammatory bowel wall thickening is  present in the sigmoid colon and proximal rectum with mild adjacent  fat stranding (series 204, image 72. Normal appendix.      Trace free fluid may be present  in the pelvis without evidence of  free air or thick-walled collections.          MUSCULOSKELETAL: No acute osseous abnormality. No suspicious osseous  lesion. Multilevel degenerative changes are present in the spine.      Impression: 1. Long segment of the inflammatory wall thickening is present in the  sigmoid colon and proximal rectum, correlate with symptoms of  infectious/inflammatory colitis.  2. Flattened IVC. Correlate with fluid volume status and hypovolemia.  3. Mild wall thickening in the posterior bladder is favored to be  reactive secondary to underlying sigmoid pathology, although  correlation with urinalysis to exclude any underlying cystitis is  recommended.      MACRO:  None.      Signed by: Kevin De La Rosa 6/24/2025 1:43 AM  Dictation workstation:   [id number]  CT head wo IV contrast  Narrative: Interpreted By:  Kevin De La Rosa,   STUDY:  CT HEAD WO IV CONTRAST;  6/24/2025 12:58 am      INDICATION:  Signs/Symptoms:n/v, overheated, dementia.          COMPARISON:  CT head dated 02/13/2025.      ACCESSION NUMBER(S):  [id number]      ORDERING CLINICIAN:  ARIS HOLLIS      TECHNIQUE:  Noncontrast axial CT scan of head was performed. Angled reformats in  brain and bone windows were generated. The images were reviewed in  bone, brain, blood and soft tissue windows.      FINDINGS:  No hyperdense intracranial hemorrhage is present. There is no mass  effect or midline shift identified.      Gray-white differentiation is intact, without evidence of acute CT  apparent transcortical infarct. Mild-to-moderate volume of the  attenuation changes present in the periventricular and subcortical  white matter of bilateral cerebral hemispheres is nonspecific, but  favored to represent sequela of microvascular disease.      There is somewhat disproportionate enlargement of the supratentorial  ventricular system relative to basal cisterns and sulci, favored to  represent asymmetric central volume loss. No  abnormal extra-axial  fluid collection is present. Basal cisterns are patent.      Scalp soft tissues do not demonstrate any acute abnormality. No acute  calvarial abnormality is present.      Mastoid air cells and middle ear cavities are clear. Visualized  paranasal sinuses are unremarkable in appearance.      Impression: 1.  No evidence of hemorrhage, CT apparent transcortical infarct, or  other acute intracranial abnormality.  2. Somewhat disproportionate enlargement of the supratentorial  ventricular system relative to basal cisterns and sulci, favored to  represent asymmetric central volume loss, although normal pressure  hydrocephalus may have similar imaging appearance.  3.  Mild-to-moderate volume of the attenuation changes are present in  the periventricular and subcortical white matter of bilateral  cerebral hemispheres, nonspecific findings favored to represent  sequela of microvascular disease.      MACRO:  None      Signed by: Kevin De La Rosa 6/24/2025 1:33 AM  Dictation workstation:   [id number]  XR chest 2 views  Narrative: Interpreted By:  Kevin De La Rosa,   STUDY:  XR CHEST 2 VIEWS;  6/24/2025 12:54 am      INDICATION:  Signs/Symptoms:n/v, AMS.          COMPARISON:  Radiographs of the chest dated 02/13/2025.      ACCESSION NUMBER(S):  [id number]      ORDERING CLINICIAN:  ARIS HOLLIS      FINDINGS:  PA and lateral radiographs of the chest were provided.              CARDIOMEDIASTINAL SILHOUETTE:  Cardiomediastinal silhouette is normal in size and configuration.      LUNGS:  Images are somewhat degraded by low lung volumes and suboptimal  positioning. Within limits of the study no sizable consolidation or  pleural effusion is present.      ABDOMEN:  No remarkable upper abdominal findings.      BONES:  No acute osseous changes.      Impression: 1.  Somewhat low lung volumes and suboptimal positioning. Within  limits of the study no consolidation or sizable pleural effusion  is  evident.              MACRO:  None      Signed by: Kevin De La Rosa 6/24/2025 1:30 AM  Dictation workstation:   [id number]           [1] cefTRIAXone, 1 g, intravenous, q24h  esomeprazole, 40 mg, oral, Daily before breakfast  rivaroxaban, 20 mg, oral, Daily with evening meal  [2] PRN medications: polyethylene glycol  [3] lactated Ringer's, 75 mL/hr, Last Rate: 75 mL/hr (06/25/25 0232)

## 2025-06-25 NOTE — CONSULTS
"Nutrition Assessment      Reason for Assessment: Admission nursing screening score of 2. Patient unable to answer questions.     Niharika Pandya is a 89 y.o. 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     Nutrition History:   Patient needs fed due to arm contractures.   Breakfast tray on bedside table - patient consumed 100% of hot cereal with butter and brown sugar and 25% of scrambled eggs. Did not appear to drink any coffee.    Food and Nutrient History: Met with patient who was smiling and appeared content. Patient unable to provide diet hx, but stated \"no\" when asked if she liked coffee and \"yes\" when asked if she liked tea. Milk? - \"yes\".  Vitamin/Herbal Supplement Use: None per home med list  Food Allergy:  (None per chart review.)       Anthropometrics:  Height: 160 cm (5' 2.99\")   Weight: 54.4 kg (120 lb) - bed scale    BMI: 21.26  IBW/kg (Dietitian Calculated): 52.3 kg  Percent of IBW: 104 %                      Weight History:   Wt Readings from Last 10 Encounters:   06/24/25 54.4 kg (120 lb)   02/12/25 57.6 kg (127 lb)   07/24/24 57.6 kg (127 lb)   01/27/20 57.6 kg (127 lb)       Weight Change %:  Weight History / % Weight Change: 5.6% wt loss x 4.5 months.  Significant Weight Loss: No    Nutrition Focused Physical Exam Findings:    Subcutaneous Fat Loss:   Orbital Fat Pads: Well nourished (slightly bulging fat pads)  Buccal Fat Pads: Well nourished (full, rounded cheeks)  Triceps: Well nourished (ample fat tissue)  Ribs: Defer  Muscle Wasting:  Temporalis: Mild-Moderate (slight depression)  Pectoralis (Clavicular Region): Mild-Moderate (some protrusion of clavicle)  Deltoid/Trapezius: Mild-Moderate (slight protrusion of acromion process)  Interosseous: Defer  Trapezius/Infraspinatus/Supraspinatus (Scapular Region): Mild-Moderate (slight protrusion of " "scapula)  Quadriceps: Defer  Gastrocnemius: Defer  Edema:  Edema: +2 mild (UE and +1 LE)  Physical Findings:  Hair: Negative  Eyes: Negative  Skin: Negative  Digestive System Findings:  (STELLA)  Mouth Findings:  (STELLA)    Nutrition Significant Labs:  CBC Trend:   Results from last 7 days   Lab Units 06/25/25  0600 06/24/25  0654 06/23/25 2044   WBC AUTO x10*3/uL 12.7* 26.8* 11.0   RBC AUTO x10*6/uL 4.56 4.77 4.28   HEMOGLOBIN g/dL 10.5* 11.1* 10.0*   HEMATOCRIT % 34.9* 34.3* 31.4*   MCV fL 77* 72* 73*   PLATELETS AUTO x10*3/uL 287 304 252    , BMP Trend:   Results from last 7 days   Lab Units 06/25/25  0600 06/24/25  0654 06/23/25 2044   GLUCOSE mg/dL 104* 107* 106*   CALCIUM mg/dL 8.3* 8.4* 7.3*   SODIUM mmol/L 139 137 136   POTASSIUM mmol/L 3.6 4.1 3.9   CO2 mmol/L 26 21 19*   CHLORIDE mmol/L 107 105 107   BUN mg/dL 12 16 15   CREATININE mg/dL 0.73 0.76 0.78    , A1C:No results found for: \"HGBA1C\", BG POCT trend:   Results from last 7 days   Lab Units 06/24/25  0308 06/23/25 2028   POCT GLUCOSE mg/dL 96 108*    , Renal Lab Trend:   Results from last 7 days   Lab Units 06/25/25  0600 06/24/25  0654 06/23/25 2044 06/23/25 2044   POTASSIUM mmol/L 3.6 4.1  --  3.9   PHOSPHORUS mg/dL 3.2 3.2   < >  --    SODIUM mmol/L 139 137  --  136   MAGNESIUM mg/dL 2.17  --   --  2.11   EGFR mL/min/1.73m*2 78 74  --  72   BUN mg/dL 12 16  --  15   CREATININE mg/dL 0.73 0.76  --  0.78    < > = values in this interval not displayed.    , Vit D: No results found for: \"VITD25\" , Vit B12:   Lab Results   Component Value Date    [id number] 473 03/03/2020        Nutrition Specific Medications:    Scheduled medications  cefTRIAXone, 1 g, intravenous, q24h  esomeprazole, 40 mg, oral, Daily before breakfast  rivaroxaban, 20 mg, oral, Daily with evening meal    Continuous medications  lactated Ringer's, 75 mL/hr, Last Rate: 75 mL/hr (06/25/25 0232)    PRN medications  PRN medications: polyethylene glycol    I/O:   Last BM Date: 06/24/25; " Stool Appearance: Watery, Liquid (06/25/25 0815)    Dietary Orders (From admission, onward)       Start     Ordered    06/24/25 1224  May Participate in Room Service  ( ROOM SERVICE MAY PARTICIPATE)  Once        Question:  .  Answer:  Yes    06/24/25 1223    06/24/25 0232  Adult diet Regular  Diet effective now        Question:  Diet type  Answer:  Regular    06/24/25 0232                     Estimated Needs:   Total Energy Estimated Needs in 24 hours (kCal): 1300 kCal  Method for Estimating Needs: IBW / 25 kcal  Total Protein Estimated Needs in 24 Hours (g): 55 g  Method for Estimating 24 Hour Protein Needs: IBW / 1.1 g              Nutrition Diagnosis   Malnutrition Diagnosis  Patient has Malnutrition Diagnosis: No (Suspect mild-moderate muscle wasting age-related.)    Nutrition Diagnosis  Patient has Nutrition Diagnosis: No      Nutrition Interventions/Recommendations   Nutrition prescription for oral nutrition    Nutrition Recommendations:    Continue Regular diet.      Ensure Plus (350 kcal and 13 g protein) ordered daily in lieu of  'clinically insignificant' wt loss.    Nutrition Interventions/Goals:   Meals and Snacks: General healthful diet  Medical Food Supplement: Commercial beverage medical food supplement therapy  Goal: 1 Ensure Plus daily.      Education Documentation  No documentation found.            Nutrition Monitoring and Evaluation   Intake / Amount of food: Consumes at least 75% or more of meals/snacks/supplements                   Goal Status: New goal(s) identified    Time Spent (min): 30 minutes

## 2025-06-25 NOTE — CARE PLAN
The patient's goals for the shift include      The clinical goals for the shift include Pt will remain HDS    Problem: Skin  Goal: Decreased wound size/increased tissue granulation at next dressing change  Outcome: Progressing  Flowsheets (Taken 6/25/2025 0231)  Decreased wound size/increased tissue granulation at next dressing change: Promote sleep for wound healing  Goal: Participates in plan/prevention/treatment measures  Outcome: Progressing  Flowsheets (Taken 6/25/2025 0231)  Participates in plan/prevention/treatment measures: Elevate heels  Goal: Prevent/manage excess moisture  Outcome: Progressing  Flowsheets (Taken 6/25/2025 0231)  Prevent/manage excess moisture:   Cleanse incontinence/protect with barrier cream   Moisturize dry skin  Goal: Prevent/minimize sheer/friction injuries  Outcome: Progressing  Flowsheets (Taken 6/25/2025 0231)  Prevent/minimize sheer/friction injuries:   Turn/reposition every 2 hours/use positioning/transfer devices   HOB 30 degrees or less

## 2025-06-25 NOTE — NURSING NOTE
Patient has had low urine output since coming onto unit and urine is tea colored. Patient's stool is loose, brown mucous like with a small amount of bright red blood noted. Patient's right knee is red and swollen. Provider informed of these findings, voiced no concern at this time.     Patient's daughter requesting an update, phone number given to provider.

## 2025-06-26 VITALS
BODY MASS INDEX: 21.26 KG/M2 | DIASTOLIC BLOOD PRESSURE: 64 MMHG | WEIGHT: 120 LBS | SYSTOLIC BLOOD PRESSURE: 142 MMHG | TEMPERATURE: 97.5 F | HEIGHT: 63 IN | RESPIRATION RATE: 16 BRPM | HEART RATE: 70 BPM | OXYGEN SATURATION: 98 %

## 2025-06-26 LAB
ALBUMIN SERPL BCP-MCNC: 3 G/DL (ref 3.4–5)
ANION GAP SERPL CALC-SCNC: 11 MMOL/L (ref 10–20)
ATRIAL RATE: 96 BPM
BACTERIA UR CULT: ABNORMAL
BUN SERPL-MCNC: 15 MG/DL (ref 6–23)
CALCIUM SERPL-MCNC: 8.2 MG/DL (ref 8.6–10.6)
CHLORIDE SERPL-SCNC: 110 MMOL/L (ref 98–107)
CO2 SERPL-SCNC: 25 MMOL/L (ref 21–32)
CREAT SERPL-MCNC: 0.76 MG/DL (ref 0.5–1.05)
EGFRCR SERPLBLD CKD-EPI 2021: 74 ML/MIN/1.73M*2
ERYTHROCYTE [DISTWIDTH] IN BLOOD BY AUTOMATED COUNT: 16.4 % (ref 11.5–14.5)
GLUCOSE SERPL-MCNC: 88 MG/DL (ref 74–99)
HCT VFR BLD AUTO: 36.2 % (ref 36–46)
HGB BLD-MCNC: 10.5 G/DL (ref 12–16)
MAGNESIUM SERPL-MCNC: 2.24 MG/DL (ref 1.6–2.4)
MCH RBC QN AUTO: 22.6 PG (ref 26–34)
MCHC RBC AUTO-ENTMCNC: 29 G/DL (ref 32–36)
MCV RBC AUTO: 78 FL (ref 80–100)
NRBC BLD-RTO: 0 /100 WBCS (ref 0–0)
P AXIS: 47 DEGREES
P OFFSET: 197 MS
P ONSET: 156 MS
PHOSPHATE SERPL-MCNC: 3.2 MG/DL (ref 2.5–4.9)
PLATELET # BLD AUTO: 253 X10*3/UL (ref 150–450)
POTASSIUM SERPL-SCNC: 4.3 MMOL/L (ref 3.5–5.3)
PR INTERVAL: 142 MS
Q ONSET: 227 MS
QRS COUNT: 15 BEATS
QRS DURATION: 74 MS
QT INTERVAL: 370 MS
QTC CALCULATION(BAZETT): 467 MS
QTC FREDERICIA: 432 MS
R AXIS: -56 DEGREES
RBC # BLD AUTO: 4.64 X10*6/UL (ref 4–5.2)
SODIUM SERPL-SCNC: 142 MMOL/L (ref 136–145)
T AXIS: 45 DEGREES
T OFFSET: 412 MS
VENTRICULAR RATE: 96 BPM
WBC # BLD AUTO: 9.8 X10*3/UL (ref 4.4–11.3)

## 2025-06-26 PROCEDURE — RXMED WILLOW AMBULATORY MEDICATION CHARGE

## 2025-06-26 PROCEDURE — 36415 COLL VENOUS BLD VENIPUNCTURE: CPT | Performed by: STUDENT IN AN ORGANIZED HEALTH CARE EDUCATION/TRAINING PROGRAM

## 2025-06-26 PROCEDURE — 99239 HOSP IP/OBS DSCHRG MGMT >30: CPT | Performed by: STUDENT IN AN ORGANIZED HEALTH CARE EDUCATION/TRAINING PROGRAM

## 2025-06-26 PROCEDURE — 85027 COMPLETE CBC AUTOMATED: CPT | Performed by: STUDENT IN AN ORGANIZED HEALTH CARE EDUCATION/TRAINING PROGRAM

## 2025-06-26 PROCEDURE — 2500000001 HC RX 250 WO HCPCS SELF ADMINISTERED DRUGS (ALT 637 FOR MEDICARE OP): Performed by: STUDENT IN AN ORGANIZED HEALTH CARE EDUCATION/TRAINING PROGRAM

## 2025-06-26 PROCEDURE — 83735 ASSAY OF MAGNESIUM: CPT | Performed by: STUDENT IN AN ORGANIZED HEALTH CARE EDUCATION/TRAINING PROGRAM

## 2025-06-26 PROCEDURE — 80069 RENAL FUNCTION PANEL: CPT | Performed by: STUDENT IN AN ORGANIZED HEALTH CARE EDUCATION/TRAINING PROGRAM

## 2025-06-26 RX ORDER — SULFAMETHOXAZOLE AND TRIMETHOPRIM 800; 160 MG/1; MG/1
1 TABLET ORAL 2 TIMES DAILY
Qty: 8 TABLET | Refills: 0 | Status: SHIPPED | OUTPATIENT
Start: 2025-06-26 | End: 2025-07-01

## 2025-06-26 RX ORDER — SULFAMETHOXAZOLE AND TRIMETHOPRIM 800; 160 MG/1; MG/1
0.5 TABLET ORAL 3 TIMES WEEKLY
Qty: 18 TABLET | Refills: 3 | Status: SHIPPED | OUTPATIENT
Start: 2025-07-02 | End: 2026-06-03

## 2025-06-26 RX ADMIN — ESOMEPRAZOLE MAGNESIUM 40 MG: 40 FOR SUSPENSION ORAL at 06:31

## 2025-06-26 ASSESSMENT — PAIN SCALES - PAIN ASSESSMENT IN ADVANCED DEMENTIA (PAINAD)
FACIALEXPRESSION: SMILING OR INEXPRESSIVE
TOTALSCORE: 0
BODYLANGUAGE: RELAXED
CONSOLABILITY: NO NEED TO CONSOLE
BREATHING: NORMAL

## 2025-06-26 NOTE — CARE PLAN
The patient's goals for the shift include      The clinical goals for the shift include Pt to be free from falls      Problem: Pain - Adult  Goal: Verbalizes/displays adequate comfort level or baseline comfort level  Outcome: Progressing     Problem: Safety - Adult  Goal: Free from fall injury  Outcome: Progressing     Problem: Skin  Goal: Decreased wound size/increased tissue granulation at next dressing change  Outcome: Progressing  Flowsheets (Taken 6/26/2025 1101)  Decreased wound size/increased tissue granulation at next dressing change: Protective dressings over bony prominences  Goal: Participates in plan/prevention/treatment measures  Outcome: Progressing  Flowsheets (Taken 6/26/2025 1101)  Participates in plan/prevention/treatment measures:   Elevate heels   Discuss with provider PT/OT consult  Goal: Prevent/manage excess moisture  Outcome: Progressing  Flowsheets (Taken 6/26/2025 1101)  Prevent/manage excess moisture:   Cleanse incontinence/protect with barrier cream   Moisturize dry skin  Goal: Prevent/minimize sheer/friction injuries  Outcome: Progressing  Flowsheets (Taken 6/26/2025 1101)  Prevent/minimize sheer/friction injuries: HOB 30 degrees or less  Goal: Promote/optimize nutrition  Outcome: Progressing  Flowsheets (Taken 6/26/2025 1101)  Promote/optimize nutrition: Monitor/record intake including meals  Goal: Promote skin healing  Outcome: Progressing  Flowsheets (Taken 6/26/2025 1101)  Promote skin healing: Assess skin/pad under line(s)/device(s)     Problem: Fall/Injury  Goal: Not fall by end of shift  Outcome: Progressing  Goal: Be free from injury by end of the shift  Outcome: Progressing  Goal: Verbalize understanding of personal risk factors for fall in the hospital  Outcome: Progressing  Goal: Use assistive devices by end of the shift  Outcome: Progressing  Goal: Pace activities to prevent fatigue by end of the shift  Outcome: Progressing

## 2025-06-26 NOTE — PROGRESS NOTES
06/26/25 1300   Discharge Planning   Home or Post Acute Services In home services   Type of Home Care Services Home health aide   Expected Discharge Disposition Home   Does the patient need discharge transport arranged? Yes   RoundTrip coordination needed? Yes   Has discharge transport been arranged? Yes   What day is the transport expected? 06/26/25   What time is the transport expected? 1700     Patient to discharge home today. Transport arranged via [facility] stretcher for 5pm. Daughter MD [place], Jennifer MADISON aware. Blue slip delivered to unit secretary. Clinicals faxed to Carol Scherer CM, at [phone].  Shirley RAMOS, RN  Transitional Care Coordinator (TCC)  [phone]

## 2025-06-26 NOTE — CARE PLAN
The patient's goals for the shift include      The clinical goals for the shift include Pt wull remain without injury    Problem: Safety - Adult  Goal: Free from fall injury  Outcome: Progressing     Problem: Nutrition  Goal: Nutrient intake appropriate for maintaining nutritional needs  Outcome: Progressing     Problem: Skin  Goal: Decreased wound size/increased tissue granulation at next dressing change  Outcome: Progressing  Goal: Participates in plan/prevention/treatment measures  Outcome: Progressing  Goal: Prevent/manage excess moisture  Outcome: Progressing  Flowsheets (Taken 6/26/2025 0151)  Prevent/manage excess moisture:   Cleanse incontinence/protect with barrier cream   Moisturize dry skin  Goal: Prevent/minimize sheer/friction injuries  Outcome: Progressing  Flowsheets (Taken 6/26/2025 0151)  Prevent/minimize sheer/friction injuries: Turn/reposition every 2 hours/use positioning/transfer devices  Goal: Promote/optimize nutrition  Outcome: Progressing  Flowsheets (Taken 6/26/2025 0151)  Promote/optimize nutrition: Assist with feeding  Goal: Promote skin healing  Outcome: Progressing  Flowsheets (Taken 6/26/2025 0151)  Promote skin healing: Assess skin/pad under line(s)/device(s)

## 2025-06-26 NOTE — DISCHARGE SUMMARY
Discharge Diagnosis  UTI (urinary tract infection), bacterial         Issues Requiring Follow-Up  None    Discharge Meds     Medication List      START taking these medications     * sulfamethoxazole-trimethoprim 800-160 mg tablet; Commonly known as:   Bactrim DS; Take 1 tablet by mouth 2 times a day for 4 days.   * sulfamethoxazole-trimethoprim 800-160 mg tablet; Commonly known as:   Bactrim DS; Take 0.5 tablets by mouth 3 times a week. Do not fill before   July 2, 2025.; Start taking on: July 2, 2025  * This list has 2 medication(s) that are the same as other medications   prescribed for you. Read the directions carefully, and ask your doctor or   other care provider to review them with you.     CONTINUE taking these medications     EYE ALLERGY ITCH RELIEF OPHT   mineral oil-hydrophilic petrolatum ointment; Commonly known as: Aquaphor   ondansetron 4 mg tablet; Commonly known as: Zofran   pantoprazole 20 mg EC tablet; Commonly known as: ProtoNix   polyethylene glycol 17 gram/dose powder; Commonly known as: Glycolax,   Miralax; Mix 17 g of powder and drink once daily. Please take for   constipation   rivaroxaban 20 mg tablet; Commonly known as: Xarelto       Test Results Pending At Discharge  Pending Labs       No current pending labs.            Hospital Course  Niharika Pandya is a 89 y.o. 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 Admitted for confusion, UTI.        #Advanced dementia- At her baseline mentation (AA&Ox1) for 48 hours leading up to her discharge    Enterobacter cloacae UTI  -She was treated with IV Rocephin, urine culture sensitivity revealed susceptibility to Bactrim  - Start Bactrim DS BID for 4 days -> transition to 0.5 tablet once 3x weekly    > 30min in discharge coordination of care which includes: discharge planning, medication  reconciliation, arranging appropriate follow up and discussion of discharge instructions with the patient.      Pertinent Physical Exam At Time of Discharge  Physical Exam  Constitutional:       Comments: Awake, in NAD  Cardiovascular:      Rate and Rhythm: Normal rate and regular rhythm.   Pulmonary:      Effort: Pulmonary effort is normal.      Breath sounds: Normal breath sounds.   Abdominal:      Palpations: Abdomen is soft.   Musculoskeletal:      Right lower leg: No edema.      Left lower leg: No edema.   Neurological:      Mental Status: Mental status is at baseline.       Outpatient Follow-Up  No future appointments.      Nico Jordan DO

## 2025-06-26 NOTE — NURSING NOTE
Pt discharged home. Peripheral iv removed. All patient belongings sent with pt. After visit summary sent home with patient. Patient dtr to review.

## 2025-06-26 NOTE — NURSING NOTE
Pt dtr called to say she did not pickup pt antibiotic. States she will pickup tomorrow. Dr. Jordan notified.

## 2025-06-27 ENCOUNTER — PHARMACY VISIT (OUTPATIENT)
Dept: PHARMACY | Facility: CLINIC | Age: OVER 89
End: 2025-06-27
Payer: COMMERCIAL

## 2025-06-30 NOTE — DOCUMENTATION CLARIFICATION NOTE
"    PATIENT:               ANANT UMAÑA  ACCT #:                  [id number]  MRN:                       [id number]  :                       1934  ADMIT DATE:       2025 8:24 PM  DISCH DATE:        2025 6:14 PM  RESPONDING PROVIDER #:        [id number]          PROVIDER RESPONSE TEXT:    Complete immobility due to severe physical disability or frailty    CDI QUERY TEXT:    Clarification    Instruction:    Based on your assessment of the patient and the clinical information, please provide the requested documentation by clicking on the appropriate radio button and enter any additional information if prompted.    Question: Is there a diagnosis indicative of the patients documented debility requiring total assistance with all ADL/IADLs    When answering this query, please exercise your independent professional judgment. The fact that a question is being asked, does not imply that any particular answer is desired or expected.    The patient's clinical indicators include:  Clinical Information:   PN: 89 YOF w/PMH s/f advanced dementia, baseline AAO x0-1; admitted for confusion and UTI.    Clinical Indicators:   ED Triage \"  fed pt dinner \"  \" less responsive with new head drooping and drooling \"  \" severe contracture of bilateral upper extremities \"     PN  \" Advanced dementia \"    Vitals I/O flowsheets indicate incontinence of urine and stool     Daily Cares/Safety flowsheet indicates:  Level of assistance - Mobility: \" Completely dependent \"  Feeding - \"Completely dependent \"  AM-PAC Basic Mobility score 6  AM-PAC Daily Activity score 6    Treatment:  Turn and reposition Q2H  Providing passive ROM  Feed patient meals    Risk Factors:  Elderly. Contractures BUE. Advanced dementia.  Options provided:  -- Complete immobility due to severe physical disability or frailty  -- Functional quadriplegia  -- Other - I will add my own diagnosis  -- Refer to Clinical Documentation Reviewer    Query " created by: Latasha Salas on 6/26/2025 10:02 AM      Electronically signed by:  YAO PERRY DO 6/30/2025 2:44 PM

## 2025-06-30 NOTE — DOCUMENTATION CLARIFICATION NOTE
"    PATIENT:               ANANT UMAÑA  ACCT #:                  [id number]  MRN:                       [id number]  :                       1934  ADMIT DATE:       2025 8:24 PM  DISCH DATE:        2025 6:14 PM  RESPONDING PROVIDER #:        [id number]          PROVIDER RESPONSE TEXT:    Metabolic encephalopathy 2/2 Enterobacter cloacae UTI superimposed on dementia    CDI QUERY TEXT:    Clarification    Instruction:    Based on your assessment of the patient and the clinical information, please provide the requested documentation by clicking on the appropriate radio button and enter any additional information if prompted.    Question: Please further clarify the most likely etiology of the altered mental status as    When answering this query, please exercise your independent professional judgment. The fact that a question is being asked, does not imply that any particular answer is desired or expected.    The patient's clinical indicators include:  Clinical Information:   HP: 89 YOF w/PMH s/f advanced dementia presented with concern for change in mentation. \" Admitted for confusion, UTI \"    Clinical Indicators:   HP Admission Reason \" presents with Altered Mental Status \"  \" baseline A&Ox0-1 \"  \" Episode of confusion perhaps  2/2 UTI vs coliits? \"     DCS Discharge diagnosis \" UTI (urinary tract infection), bacterial \"  \" Advanced dementia- At her baseline mentation (AA&Ox1) for 48 hours leading up to her discharge \"  \" Enterobacter cloacae UTI \"  \" treated with IV Rocephin, urine culture sensitivity revealed susceptibility to Bactrim \"  \" Bactrim DS BID for 4 days \"     Carbon Monoxide 8.4   VBG pH 7.31, Oxy hemoglobin 17.6, Lactate 2.8, Base Excess -3.5   UA Leukocyte Esterase 500, WBC >50, Bacteria 1+   UCx greater than/equal to 215273 Enterobacter cloacae complex    Treatment:  Given IVF  Ceftriaxone IV  Start Bactrim  Monitor mentation and labs    Risk " Factors:  Elderly. Advanced dementia. Elevated CO. UTI.  Options provided:  -- Metabolic encephalopathy 2/2 Enterobacter cloacae UTI superimposed on dementia  -- Toxic encephalopathy 2/2 elevated carbon monoxide level with CO poisoning superimposed on dementia  -- Metabolic and Toxic encephalopathy 2/2 Enterobacter cloacae UTI and elevated carbon monoxide level with CO poisoning superimposed on dementia  -- Worsening dementia  -- Other - I will add my own diagnosis  -- Refer to Clinical Documentation Reviewer    Query created by: Latasha Salas on 6/27/2025 7:30 AM      Electronically signed by:  YAO PERRY DO 6/30/2025 2:44 PM